# Patient Record
Sex: MALE | Race: WHITE | NOT HISPANIC OR LATINO | Employment: UNEMPLOYED | URBAN - METROPOLITAN AREA
[De-identification: names, ages, dates, MRNs, and addresses within clinical notes are randomized per-mention and may not be internally consistent; named-entity substitution may affect disease eponyms.]

---

## 2023-05-15 LAB
ALBUMIN (MG/L) IN URINE: 32.2 MG/L
ALBUMIN/CREATININE (UG/MG) IN URINE: 32.2 UG/MG CRT (ref 0–30)
ANION GAP IN SER/PLAS: 12 MMOL/L (ref 10–20)
CALCIUM (MG/DL) IN SER/PLAS: 9.7 MG/DL (ref 8.6–10.3)
CARBON DIOXIDE, TOTAL (MMOL/L) IN SER/PLAS: 27 MMOL/L (ref 21–32)
CHLORIDE (MMOL/L) IN SER/PLAS: 103 MMOL/L (ref 98–107)
CHOLESTEROL (MG/DL) IN SER/PLAS: 173 MG/DL (ref 0–199)
CHOLESTEROL IN HDL (MG/DL) IN SER/PLAS: 45.8 MG/DL
CHOLESTEROL/HDL RATIO: 3.8
CREATININE (MG/DL) IN SER/PLAS: 0.91 MG/DL (ref 0.5–1.3)
CREATININE (MG/DL) IN URINE: 100 MG/DL (ref 20–370)
GFR MALE: >90 ML/MIN/1.73M2
GLUCOSE (MG/DL) IN SER/PLAS: 187 MG/DL (ref 74–99)
LDL: 58 MG/DL (ref 0–99)
NON HDL CHOLESTEROL: 127 MG/DL
POTASSIUM (MMOL/L) IN SER/PLAS: 4.4 MMOL/L (ref 3.5–5.3)
SODIUM (MMOL/L) IN SER/PLAS: 138 MMOL/L (ref 136–145)
TRIGLYCERIDE (MG/DL) IN SER/PLAS: 348 MG/DL (ref 0–149)
UREA NITROGEN (MG/DL) IN SER/PLAS: 27 MG/DL (ref 6–23)
VLDL: 70 MG/DL (ref 0–40)

## 2023-05-16 LAB
ESTIMATED AVERAGE GLUCOSE FOR HBA1C: 217 MG/DL
HEMOGLOBIN A1C/HEMOGLOBIN TOTAL IN BLOOD: 9.2 %

## 2023-06-20 LAB
ANION GAP IN SER/PLAS: 14 MMOL/L (ref 10–20)
CALCIUM (MG/DL) IN SER/PLAS: 9.4 MG/DL (ref 8.6–10.3)
CARBON DIOXIDE, TOTAL (MMOL/L) IN SER/PLAS: 22 MMOL/L (ref 21–32)
CHLORIDE (MMOL/L) IN SER/PLAS: 106 MMOL/L (ref 98–107)
CHOLESTEROL (MG/DL) IN SER/PLAS: 173 MG/DL (ref 0–199)
CHOLESTEROL IN HDL (MG/DL) IN SER/PLAS: 38.7 MG/DL
CHOLESTEROL/HDL RATIO: 4.5
CREATININE (MG/DL) IN SER/PLAS: 0.93 MG/DL (ref 0.5–1.3)
ESTIMATED AVERAGE GLUCOSE FOR HBA1C: 177 MG/DL
GFR MALE: 89 ML/MIN/1.73M2
GLUCOSE (MG/DL) IN SER/PLAS: 140 MG/DL (ref 74–99)
HEMOGLOBIN A1C/HEMOGLOBIN TOTAL IN BLOOD: 7.8 %
LDL: 97 MG/DL (ref 0–99)
POTASSIUM (MMOL/L) IN SER/PLAS: 4.5 MMOL/L (ref 3.5–5.3)
SODIUM (MMOL/L) IN SER/PLAS: 137 MMOL/L (ref 136–145)
TRIGLYCERIDE (MG/DL) IN SER/PLAS: 186 MG/DL (ref 0–149)
UREA NITROGEN (MG/DL) IN SER/PLAS: 28 MG/DL (ref 6–23)
VLDL: 37 MG/DL (ref 0–40)

## 2023-11-06 PROBLEM — E09.9 STEROID-INDUCED DIABETES (MULTI): Status: ACTIVE | Noted: 2023-11-06

## 2023-11-06 PROBLEM — L72.3 SEBACEOUS CYST: Status: ACTIVE | Noted: 2023-04-24

## 2023-11-06 PROBLEM — T38.0X5A STEROID-INDUCED DIABETES (MULTI): Status: ACTIVE | Noted: 2023-11-06

## 2023-11-06 PROBLEM — D22.72 MELANOCYTIC NEVI OF LEFT LOWER LIMB, INCLUDING HIP: Status: ACTIVE | Noted: 2023-04-24

## 2023-11-06 PROBLEM — G89.29 CHRONIC PAIN OF BOTH KNEES: Status: ACTIVE | Noted: 2023-11-06

## 2023-11-06 PROBLEM — M25.572 ANKLE PAIN, LEFT: Status: ACTIVE | Noted: 2023-11-06

## 2023-11-06 PROBLEM — L82.0 INFLAMED SEBORRHEIC KERATOSIS: Status: ACTIVE | Noted: 2023-04-24

## 2023-11-06 PROBLEM — E55.9 VITAMIN D DEFICIENCY: Status: ACTIVE | Noted: 2023-11-06

## 2023-11-06 PROBLEM — L73.8 OTHER SPECIFIED FOLLICULAR DISORDERS: Status: ACTIVE | Noted: 2023-04-24

## 2023-11-06 PROBLEM — K63.5 BENIGN COLONIC POLYP: Status: ACTIVE | Noted: 2023-11-06

## 2023-11-06 PROBLEM — R74.8 ELEVATED LIVER ENZYMES: Status: ACTIVE | Noted: 2023-11-06

## 2023-11-06 PROBLEM — E11.9 TYPE 2 DIABETES MELLITUS (MULTI): Status: ACTIVE | Noted: 2023-11-06

## 2023-11-06 PROBLEM — I10 HTN (HYPERTENSION): Status: ACTIVE | Noted: 2023-11-06

## 2023-11-06 PROBLEM — N52.9 ERECTILE DYSFUNCTION: Status: ACTIVE | Noted: 2023-11-06

## 2023-11-06 PROBLEM — K76.0 FATTY LIVER: Status: ACTIVE | Noted: 2023-11-06

## 2023-11-06 PROBLEM — M25.561 CHRONIC PAIN OF BOTH KNEES: Status: ACTIVE | Noted: 2023-11-06

## 2023-11-06 PROBLEM — L21.9 SEBORRHEIC DERMATITIS, UNSPECIFIED: Status: ACTIVE | Noted: 2023-04-24

## 2023-11-06 PROBLEM — E78.00 ELEVATED LOW DENSITY LIPOPROTEIN (LDL) CHOLESTEROL LEVEL: Status: ACTIVE | Noted: 2023-11-06

## 2023-11-06 PROBLEM — R53.83 FATIGUE: Status: ACTIVE | Noted: 2023-11-06

## 2023-11-06 PROBLEM — M25.562 CHRONIC PAIN OF BOTH KNEES: Status: ACTIVE | Noted: 2023-11-06

## 2023-11-06 PROBLEM — L73.9 FOLLICULAR DISORDER, UNSPECIFIED: Status: ACTIVE | Noted: 2023-04-24

## 2023-11-06 PROBLEM — D22.5 MELANOCYTIC NEVI OF TRUNK: Status: ACTIVE | Noted: 2023-04-24

## 2023-11-06 PROBLEM — C18.7 MALIGNANT NEOPLASM OF SIGMOID COLON (MULTI): Status: ACTIVE | Noted: 2023-11-06

## 2023-11-06 PROBLEM — E78.1 HYPERTRIGLYCERIDEMIA: Status: ACTIVE | Noted: 2023-11-06

## 2023-11-06 PROBLEM — M67.88 ACHILLES TENDINOSIS OF LEFT LOWER EXTREMITY: Status: ACTIVE | Noted: 2023-11-06

## 2023-11-06 PROBLEM — L21.8 OTHER SEBORRHEIC DERMATITIS: Status: ACTIVE | Noted: 2023-04-24

## 2023-11-06 PROBLEM — C43.59 MALIGNANT MELANOMA OF SKIN OF ABDOMEN (MULTI): Status: ACTIVE | Noted: 2023-11-06

## 2023-11-06 PROBLEM — M25.511 RIGHT SHOULDER PAIN: Status: ACTIVE | Noted: 2023-11-06

## 2023-11-06 PROBLEM — Z85.820 PERSONAL HISTORY OF MALIGNANT MELANOMA OF SKIN: Status: ACTIVE | Noted: 2023-04-24

## 2023-11-06 PROBLEM — M17.0 ARTHRITIS OF BOTH KNEES: Status: ACTIVE | Noted: 2023-11-06

## 2023-11-06 PROBLEM — L57.0 ACTINIC KERATOSIS: Status: ACTIVE | Noted: 2023-04-24

## 2023-11-06 PROBLEM — M25.521 RIGHT ELBOW PAIN: Status: ACTIVE | Noted: 2023-11-06

## 2023-11-06 PROBLEM — M79.89 SWELLING OF LEFT FOOT: Status: ACTIVE | Noted: 2023-11-06

## 2023-11-06 PROBLEM — M19.90 DJD (DEGENERATIVE JOINT DISEASE): Status: ACTIVE | Noted: 2023-11-06

## 2023-11-06 RX ORDER — GINGER ROOT/GINGER ROOT EXT 262.5 MG
CAPSULE ORAL
COMMUNITY
Start: 2022-01-06

## 2023-11-06 RX ORDER — PANTOPRAZOLE SODIUM 20 MG/1
20 TABLET, DELAYED RELEASE ORAL
COMMUNITY
Start: 2023-07-18

## 2023-11-06 RX ORDER — METFORMIN HYDROCHLORIDE 500 MG/1
2 TABLET, EXTENDED RELEASE ORAL 2 TIMES DAILY
COMMUNITY
Start: 2018-12-19 | End: 2024-02-09 | Stop reason: SDUPTHER

## 2023-11-06 RX ORDER — DOCUSATE SODIUM 100 MG/1
1 CAPSULE, LIQUID FILLED ORAL 3 TIMES DAILY
COMMUNITY
Start: 2023-08-14 | End: 2023-12-05 | Stop reason: WASHOUT

## 2023-11-06 RX ORDER — IBUPROFEN 200 MG
CAPSULE ORAL
COMMUNITY
Start: 2021-06-15

## 2023-11-06 RX ORDER — ACETAMINOPHEN 500 MG
1000 TABLET ORAL 3 TIMES DAILY
COMMUNITY
Start: 2023-07-18 | End: 2023-12-05 | Stop reason: WASHOUT

## 2023-11-06 RX ORDER — KETOCONAZOLE 20 MG/G
CREAM TOPICAL
COMMUNITY
Start: 2021-05-10 | End: 2023-12-05 | Stop reason: WASHOUT

## 2023-11-06 RX ORDER — HYDROCORTISONE ACETATE, IODOQUINOL 19; 10 MG/G; MG/G
CREAM TOPICAL
COMMUNITY
Start: 2021-11-19 | End: 2023-12-05 | Stop reason: WASHOUT

## 2023-11-06 RX ORDER — PHENYLEPHRINE HCL 10 MG
TABLET ORAL
COMMUNITY
End: 2023-12-05 | Stop reason: WASHOUT

## 2023-11-06 RX ORDER — OMEGA-3/DHA/EPA/FISH OIL 1600MG/5ML
1 LIQUID (ML) ORAL DAILY
COMMUNITY

## 2023-11-06 RX ORDER — PANTOPRAZOLE SODIUM 40 MG/1
1 TABLET, DELAYED RELEASE ORAL DAILY
COMMUNITY
Start: 2023-08-14

## 2023-11-06 RX ORDER — ASPIRIN 325 MG
1 TABLET, DELAYED RELEASE (ENTERIC COATED) ORAL DAILY
COMMUNITY

## 2023-11-06 RX ORDER — OXYCODONE HYDROCHLORIDE 5 MG/1
5-10 TABLET ORAL EVERY 6 HOURS PRN
COMMUNITY
Start: 2023-07-18

## 2023-11-06 RX ORDER — BACLOFEN 10 MG/1
10 TABLET ORAL 3 TIMES DAILY
COMMUNITY
Start: 2023-10-26

## 2023-11-06 RX ORDER — LISINOPRIL 20 MG/1
1 TABLET ORAL DAILY
COMMUNITY
Start: 2016-01-18 | End: 2024-02-09 | Stop reason: SDUPTHER

## 2023-11-06 RX ORDER — MULTIVITAMIN
TABLET ORAL
COMMUNITY

## 2023-11-06 RX ORDER — VIT C/E/ZN/COPPR/LUTEIN/ZEAXAN 250MG-90MG
25 CAPSULE ORAL DAILY
COMMUNITY
End: 2023-12-05 | Stop reason: WASHOUT

## 2023-11-06 RX ORDER — CHOLECALCIFEROL (VITAMIN D3) 125 MCG
1 CAPSULE ORAL EVERY 12 HOURS PRN
COMMUNITY

## 2023-11-06 RX ORDER — DAPAGLIFLOZIN 10 MG/1
10 TABLET, FILM COATED ORAL DAILY
COMMUNITY
Start: 2023-05-16 | End: 2024-03-18 | Stop reason: SDUPTHER

## 2023-11-06 RX ORDER — CLINDAMYCIN PHOSPHATE 10 MG/ML
SOLUTION TOPICAL
COMMUNITY
Start: 2022-11-22 | End: 2023-12-05 | Stop reason: WASHOUT

## 2023-11-06 RX ORDER — METFORMIN HYDROCHLORIDE 1000 MG/1
1000 TABLET ORAL
COMMUNITY
End: 2023-12-05 | Stop reason: WASHOUT

## 2023-11-06 RX ORDER — ASCORBIC ACID 500 MG
TABLET ORAL
COMMUNITY

## 2023-11-06 RX ORDER — FLAXSEED OIL 1000 MG
1 CAPSULE ORAL DAILY
COMMUNITY

## 2023-11-09 ENCOUNTER — OFFICE VISIT (OUTPATIENT)
Dept: DERMATOLOGY | Facility: CLINIC | Age: 68
End: 2023-11-09
Payer: MEDICARE

## 2023-11-09 DIAGNOSIS — L57.0 ACTINIC KERATOSIS: ICD-10-CM

## 2023-11-09 DIAGNOSIS — K13.0 CHEILITIS: ICD-10-CM

## 2023-11-09 PROCEDURE — 3074F SYST BP LT 130 MM HG: CPT | Performed by: SPECIALIST

## 2023-11-09 PROCEDURE — 3078F DIAST BP <80 MM HG: CPT | Performed by: SPECIALIST

## 2023-11-09 PROCEDURE — 99213 OFFICE O/P EST LOW 20 MIN: CPT | Performed by: SPECIALIST

## 2023-11-09 PROCEDURE — 1125F AMNT PAIN NOTED PAIN PRSNT: CPT | Performed by: SPECIALIST

## 2023-11-09 PROCEDURE — 3051F HG A1C>EQUAL 7.0%<8.0%: CPT | Performed by: SPECIALIST

## 2023-11-09 PROCEDURE — 4010F ACE/ARB THERAPY RXD/TAKEN: CPT | Performed by: SPECIALIST

## 2023-11-09 NOTE — PROGRESS NOTES
Patient is here for a full body exam. Full body exam done in the presence of chaperone. Eyes:  Conjunctiva normal lids normal. Mouth and throat: Lips normal teeth normal gums normal.  Oropharynx is moist and normal. Neck: Neck is supple without masses. CV: Extremities are  normal without calf tenderness edema varicosities. Abdomen: Is no hepatosplenomegaly.  Lymphatic: Is no cervical axillary or inguinal lymphadenopathy. Extremities: Inspection palpation  of digits and nails is normal without clubbing or cyanosis. Neuro/psych: Orientation to time,  place, person situation is normal. Mood/affect is normal. Skin: Palpation of scalp is normal  inspection of Hair and scalp, eyebrows, face, and extremities normal. Inspection/palpation of  Head/face mild photo damage. Neck mild photo damage. Chest mild photo damage. Breasts are  normal axillary vaults are normal. Abdomen normal.   Genitalia normal groin normal, buttocks normal. Back mild photo damage. Right upper extremity photo damage. Left upper extremity photo damage. Right lower extremity photo damage. Left lower extremity normal. Inspection of eccrine apocrine glands of skin and subcutaneous tissues normal.  Subjective     Cosme Noel is a 68 y.o. male who presents for the following: Skin Check (Hx MM ).     Review of Systems:  No other skin or systemic complaints other than what is documented elsewhere in the note.    The following portions of the chart were reviewed this encounter and updated as appropriate:          Skin Cancer History  No skin cancer on file.      Specialty Problems          Dermatology Problems    Actinic keratosis    Follicular disorder, unspecified    Inflamed seborrheic keratosis    Melanocytic nevi of left lower limb, including hip    Melanocytic nevi of trunk    Other seborrheic dermatitis    Other specified follicular disorders    Personal history of malignant melanoma of skin    Sebaceous cyst    Seborrheic dermatitis, unspecified     Malignant melanoma of skin of abdomen (CMS/HCC)        Objective   Well appearing patient in no apparent distress; mood and affect are within normal limits.    A focused skin examination was performed. All findings within normal limits unless otherwise noted below.    Assessment/Plan   1. Actinic keratosis  Face  Erythematous macules with gritty scale.    Destr of lesion - Face  Complexity: simple    Destruction method: cryotherapy    Informed consent: discussed and consent obtained    Lesion destroyed using liquid nitrogen: Yes    Cryotherapy cycles:  1  Outcome: patient tolerated procedure well with no complications    Post-procedure details: wound care instructions given      2. Cheilitis  Lower lip

## 2023-11-10 RX ORDER — FLUOROURACIL 20 MG/ML
SOLUTION TOPICAL
Qty: 10 ML | Refills: 0 | Status: SHIPPED | OUTPATIENT
Start: 2023-11-10 | End: 2023-12-05 | Stop reason: WASHOUT

## 2023-11-15 ENCOUNTER — TELEPHONE (OUTPATIENT)
Dept: DERMATOLOGY | Facility: CLINIC | Age: 68
End: 2023-11-15

## 2023-11-15 NOTE — TELEPHONE ENCOUNTER
Cosme Sophia had  an appt scheduled for a 1 week med follow up. The med just got filled today. He rescheduled his appointment on 11/30. Should he start taking his med today or wait till the following Wednesday 11/23?

## 2023-11-16 ENCOUNTER — APPOINTMENT (OUTPATIENT)
Dept: DERMATOLOGY | Facility: CLINIC | Age: 68
End: 2023-11-16

## 2023-11-30 ENCOUNTER — OFFICE VISIT (OUTPATIENT)
Dept: DERMATOLOGY | Facility: CLINIC | Age: 68
End: 2023-11-30
Payer: MEDICARE

## 2023-11-30 DIAGNOSIS — K13.0 CHEILITIS: ICD-10-CM

## 2023-11-30 PROCEDURE — 99213 OFFICE O/P EST LOW 20 MIN: CPT | Performed by: SPECIALIST

## 2023-11-30 PROCEDURE — 17000 DESTRUCT PREMALG LESION: CPT | Performed by: SPECIALIST

## 2023-11-30 PROCEDURE — 17003 DESTRUCT PREMALG LES 2-14: CPT | Performed by: SPECIALIST

## 2023-11-30 PROCEDURE — 3051F HG A1C>EQUAL 7.0%<8.0%: CPT | Performed by: SPECIALIST

## 2023-11-30 PROCEDURE — 4010F ACE/ARB THERAPY RXD/TAKEN: CPT | Performed by: SPECIALIST

## 2023-11-30 PROCEDURE — 1125F AMNT PAIN NOTED PAIN PRSNT: CPT | Performed by: SPECIALIST

## 2023-11-30 NOTE — PROGRESS NOTES
Subjective     Cosme Noel is a 68 y.o. male who presents for the following: Follow-up (Cheilitis - Bottom Lip ).     Review of Systems:  No other skin or systemic complaints other than what is documented elsewhere in the note.    The following portions of the chart were reviewed this encounter and updated as appropriate:          Skin Cancer History  No skin cancer on file.      Specialty Problems          Dermatology Problems    Actinic keratosis    Follicular disorder, unspecified    Inflamed seborrheic keratosis    Melanocytic nevi of left lower limb, including hip    Melanocytic nevi of trunk    Other seborrheic dermatitis    Other specified follicular disorders    Personal history of malignant melanoma of skin    Sebaceous cyst    Seborrheic dermatitis, unspecified    Malignant melanoma of skin of abdomen (CMS/HCC)        Objective   Well appearing patient in no apparent distress; mood and affect are within normal limits.    A focused skin examination was performed. All findings within normal limits unless otherwise noted below.    Assessment/Plan   1. Cheilitis  Mid Lower [place] Lip

## 2023-11-30 NOTE — PROGRESS NOTES
Subjective     Cosme Noel is a 68 y.o. male who presents for the following: Follow-up (Cheilitis - Bottom Lip ) and Actinic Keratosis (Face.).     Review of Systems:  No other skin or systemic complaints other than what is documented elsewhere in the note.    The following portions of the chart were reviewed this encounter and updated as appropriate:          Skin Cancer History  No skin cancer on file.      Specialty Problems          Dermatology Problems    Actinic keratosis    Follicular disorder, unspecified    Inflamed seborrheic keratosis    Melanocytic nevi of left lower limb, including hip    Melanocytic nevi of trunk    Other seborrheic dermatitis    Other specified follicular disorders    Personal history of malignant melanoma of skin    Sebaceous cyst    Seborrheic dermatitis, unspecified    Malignant melanoma of skin of abdomen (CMS/HCC)        Objective   Well appearing patient in no apparent distress; mood and affect are within normal limits.    A focused skin examination was performed. All findings within normal limits unless otherwise noted below.    Assessment/Plan   1. Cheilitis  Mid Lower [place] Lip

## 2023-11-30 NOTE — PROGRESS NOTES
Subjective   HPI: Cosme Noel is a 68 y.o. male is here for evaluation and treatment of spots on my face and lip.    ROS: No other skin or systemic complaints other than what is documented elsewhere in the note.    ALLERGIES: Bee venom protein (honey bee)    SOCIAL:  has no history on file for tobacco use, alcohol use, and drug use.    Objective     Description: Patient has several erythematous scaly sensitive lesions involving his face.  There is some inflammation of the lower lip.  Patient has been using 2% 5-FU to this area.  Patient should continue topical 5-FU for 1 more week and discontinue use.    Assessment/Plan   1. Cheilitis  Mid Lower [place] Lip         FOLLOW UP: 3 months.    The patient was encouraged to contact me with any further questions or concerns.  Ankit Blanchard MD  11/30/2023

## 2023-12-05 ENCOUNTER — OFFICE VISIT (OUTPATIENT)
Dept: ENDOCRINOLOGY | Facility: CLINIC | Age: 68
End: 2023-12-05
Payer: MEDICARE

## 2023-12-05 VITALS
WEIGHT: 203 LBS | BODY MASS INDEX: 30.07 KG/M2 | SYSTOLIC BLOOD PRESSURE: 138 MMHG | HEART RATE: 71 BPM | DIASTOLIC BLOOD PRESSURE: 80 MMHG | HEIGHT: 69 IN

## 2023-12-05 DIAGNOSIS — E11.9 TYPE 2 DIABETES MELLITUS WITHOUT COMPLICATION, UNSPECIFIED WHETHER LONG TERM INSULIN USE (MULTI): Primary | ICD-10-CM

## 2023-12-05 LAB
POC FINGERSTICK BLOOD GLUCOSE: 168 MG/DL (ref 70–100)
POC HEMOGLOBIN A1C: 7.6 % (ref 4.2–6.5)

## 2023-12-05 PROCEDURE — 1160F RVW MEDS BY RX/DR IN RCRD: CPT | Performed by: INTERNAL MEDICINE

## 2023-12-05 PROCEDURE — 3079F DIAST BP 80-89 MM HG: CPT | Performed by: INTERNAL MEDICINE

## 2023-12-05 PROCEDURE — 83036 HEMOGLOBIN GLYCOSYLATED A1C: CPT | Performed by: INTERNAL MEDICINE

## 2023-12-05 PROCEDURE — 4010F ACE/ARB THERAPY RXD/TAKEN: CPT | Performed by: INTERNAL MEDICINE

## 2023-12-05 PROCEDURE — 3051F HG A1C>EQUAL 7.0%<8.0%: CPT | Performed by: INTERNAL MEDICINE

## 2023-12-05 PROCEDURE — 1036F TOBACCO NON-USER: CPT | Performed by: INTERNAL MEDICINE

## 2023-12-05 PROCEDURE — 99214 OFFICE O/P EST MOD 30 MIN: CPT | Performed by: INTERNAL MEDICINE

## 2023-12-05 PROCEDURE — 82962 GLUCOSE BLOOD TEST: CPT | Performed by: INTERNAL MEDICINE

## 2023-12-05 PROCEDURE — 1125F AMNT PAIN NOTED PAIN PRSNT: CPT | Performed by: INTERNAL MEDICINE

## 2023-12-05 PROCEDURE — 3075F SYST BP GE 130 - 139MM HG: CPT | Performed by: INTERNAL MEDICINE

## 2023-12-05 PROCEDURE — 1159F MED LIST DOCD IN RCRD: CPT | Performed by: INTERNAL MEDICINE

## 2023-12-05 PROCEDURE — 95251 CONT GLUC MNTR ANALYSIS I&R: CPT | Performed by: INTERNAL MEDICINE

## 2023-12-05 RX ORDER — BUTYROSPERMUM PARKII(SHEA BUTTER), SIMMONDSIA CHINENSIS (JOJOBA) SEED OIL, ALOE BARBADENSIS LEAF EXTRACT .01; 1; 3.5 G/100G; G/100G; G/100G
250 LIQUID TOPICAL 2 TIMES DAILY
COMMUNITY

## 2023-12-05 RX ORDER — GLIMEPIRIDE 2 MG/1
2 TABLET ORAL
Qty: 60 TABLET | Refills: 5 | Status: SHIPPED | OUTPATIENT
Start: 2023-12-05 | End: 2023-12-05 | Stop reason: WASHOUT

## 2023-12-05 RX ORDER — GLIMEPIRIDE 1 MG/1
1 TABLET ORAL 2 TIMES DAILY
Qty: 60 TABLET | Refills: 5 | Status: SHIPPED | OUTPATIENT
Start: 2023-12-05 | End: 2024-06-02

## 2023-12-05 RX ORDER — GLUCOSAMINE/CHONDRO SU A 500-400 MG
1 TABLET ORAL 3 TIMES DAILY
COMMUNITY

## 2023-12-05 NOTE — PROGRESS NOTES
"Subjective   Cosme Noel is a 68 y.o. male who presents for follow-up of Type 2 diabetes mellitus. The initial diagnosis of diabetes was made in 2017  after undergoing chemotherapy.      He states that his balance is off.  His endurance is low.  He needs to walk three miles without assistance in order to undergo knee surgery.  PT ended in November after undergoing hip surgery in July 2023.      He feels as though life is pointless.  He already cancer.  He thinks that medication is all futile.  He feels as though life went downhill after the hip replacement.      Known complications due to diabetes included peripheral neuropathy    Cardiovascular risk factors include advanced age (older than 55 for men, 65 for women), diabetes mellitus, male gender, and microalbuminuria. The patient is on an ACE inhibitor or angiotensin II receptor blocker.  The patient has not been previously hospitalized due to diabetic ketoacidosis.     Current symptoms/problems include none. His clinical course has been stable.     The patient is not currently checking the blood glucose.       [employer] CGM DATA FROM SAMPLE  Average 168mg.dL  0% of values below target range  67% of values within target range  33% of values above target range      Previous medications tried:  Trulicity for three months      Current Medication Regimen  Metformin 1000mg twice daily   Farxiga 10mg once daily      MEALS:   Breakfast - oatmeal with sunflower seeds  Lunch - can omit   Dinner - salad, red meats, soups  Snacks - popcorn, apple, banana, blueberries   Beverages - water, sun tea (unsweetened); he has not had beer in five weeks     Exercise regimen - daily; walks 2 miles five times per week        Objective   /80 (BP Location: Left arm, Patient Position: Sitting, BP Cuff Size: Adult)   Pulse 71   Ht 1.753 m (5' 9\")   Wt 92.1 kg (203 lb)   BMI 29.98 kg/m² Dictation #1  MRN:[id number]  CSN:[id number]   Physical Exam  Vitals and nursing note " reviewed.   Constitutional:       General: He is not in acute distress.     Appearance: Normal appearance. He is normal weight.   HENT:      Head: Normocephalic and atraumatic.      Nose: Nose normal.      Mouth/Throat:      Mouth: Mucous membranes are moist.   Eyes:      Extraocular Movements: Extraocular movements intact.   Pulmonary:      Effort: Pulmonary effort is normal.      Breath sounds: Normal breath sounds.   Musculoskeletal:         General: Normal range of motion.   Neurological:      Mental Status: He is alert and oriented to person, place, and time.   Psychiatric:      Comments: Despondent          Lab Review  Glucose (mg/dL)   Date Value   06/20/2023 140 (H)   06/07/2023 166 (H)   05/15/2023 187 (H)     POC HEMOGLOBIN A1c (%)   Date Value   12/05/2023 7.6 (A)     Hemoglobin A1C (%)   Date Value   06/27/2023 7.6 (H)   06/20/2023 7.8 (A)   05/15/2023 9.2 (A)   03/14/2022 8.4 (A)     Bicarbonate (mmol/L)   Date Value   06/20/2023 22   06/07/2023 21   05/15/2023 27     Urea Nitrogen (mg/dL)   Date Value   06/20/2023 28 (H)   06/07/2023 34 (H)   05/15/2023 27 (H)     Creatinine (mg/dL)   Date Value   06/20/2023 0.93   06/07/2023 0.99   05/15/2023 0.91       Health Maintenance:   Foot Exam: August 14, 2023  Eye Exam:  November 2022  Lipid Panel:  June 20, 2023  Urine Albumin:  May 15, 2023    Assessment/Plan   68-year-old male presents for follow up for type 2 diabetes mellitus. His last hemoglobin A1c was found to be 7.6% as of today. His blood pressure is at goal. He is noted to be on an ACE inhibitor.    Type 2 diabetes mellitus (CMS/[facility])  To continue Metformin 1000mg twice daily   To continue Farxiga 10mg once daily   To commence Glimepiride 1mg twice daily   For patient assistance program with Farxiga   Please find a new PCP as there seems to be great with regards to mental health  Counseled that the goal A1C should be 7% or less  Counseled glycemic control is warranted to prevent microvascular  complications  Please check blood sugars 1-2 times daily and keep a detailed log    For follow up in 5 months

## 2023-12-05 NOTE — PATIENT INSTRUCTIONS
Thank you for choosing [facility]  for your health care needs.  If you have any questions, concerns or medical needs, please feel free to contact our office at [phone].    Please ensure you complete your blood work one week before the next scheduled appointment.    To continue Metformin 1000mg twice daily   To continue Farxiga 10mg once daily   To commence Glimepiride 1mg twice daily   For patient assistance program with Farxiga   Please find a new PCP   For follow up in 5 months

## 2023-12-06 ENCOUNTER — LAB (OUTPATIENT)
Dept: LAB | Facility: CLINIC | Age: 68
End: 2023-12-06
Payer: MEDICARE

## 2023-12-06 ENCOUNTER — TELEPHONE (OUTPATIENT)
Dept: HEMATOLOGY/ONCOLOGY | Facility: HOSPITAL | Age: 68
End: 2023-12-06

## 2023-12-06 DIAGNOSIS — C18.7 MALIGNANT NEOPLASM OF SIGMOID COLON (MULTI): ICD-10-CM

## 2023-12-06 DIAGNOSIS — C18.7 MALIGNANT NEOPLASM OF SIGMOID COLON (MULTI): Primary | ICD-10-CM

## 2023-12-06 LAB
ALBUMIN SERPL BCP-MCNC: 4.8 G/DL (ref 3.4–5)
ALP SERPL-CCNC: 101 U/L (ref 33–136)
ALT SERPL W P-5'-P-CCNC: 31 U/L (ref 10–52)
ANION GAP SERPL CALC-SCNC: 17 MMOL/L (ref 10–20)
AST SERPL W P-5'-P-CCNC: 26 U/L (ref 9–39)
BASOPHILS # BLD AUTO: 0.04 X10*3/UL (ref 0–0.1)
BASOPHILS NFR BLD AUTO: 0.5 %
BILIRUB SERPL-MCNC: 0.5 MG/DL (ref 0–1.2)
BUN SERPL-MCNC: 22 MG/DL (ref 6–23)
CALCIUM SERPL-MCNC: 10.1 MG/DL (ref 8.6–10.6)
CEA SERPL-MCNC: 0.9 UG/L
CHLORIDE SERPL-SCNC: 103 MMOL/L (ref 98–107)
CO2 SERPL-SCNC: 27 MMOL/L (ref 21–32)
CREAT SERPL-MCNC: 0.9 MG/DL (ref 0.5–1.3)
EOSINOPHIL # BLD AUTO: 0.08 X10*3/UL (ref 0–0.7)
EOSINOPHIL NFR BLD AUTO: 1 %
ERYTHROCYTE [DISTWIDTH] IN BLOOD BY AUTOMATED COUNT: 13.8 % (ref 11.5–14.5)
GFR SERPL CREATININE-BSD FRML MDRD: >90 ML/MIN/1.73M*2
GLUCOSE SERPL-MCNC: 156 MG/DL (ref 74–99)
HCT VFR BLD AUTO: 45.2 % (ref 41–52)
HGB BLD-MCNC: 15.1 G/DL (ref 13.5–17.5)
IMM GRANULOCYTES # BLD AUTO: 0.01 X10*3/UL (ref 0–0.7)
IMM GRANULOCYTES NFR BLD AUTO: 0.1 % (ref 0–0.9)
LYMPHOCYTES # BLD AUTO: 2 X10*3/UL (ref 1.2–4.8)
LYMPHOCYTES NFR BLD AUTO: 25 %
MCH RBC QN AUTO: 30.3 PG (ref 26–34)
MCHC RBC AUTO-ENTMCNC: 33.4 G/DL (ref 32–36)
MCV RBC AUTO: 91 FL (ref 80–100)
MONOCYTES # BLD AUTO: 0.64 X10*3/UL (ref 0.1–1)
MONOCYTES NFR BLD AUTO: 8 %
NEUTROPHILS # BLD AUTO: 5.24 X10*3/UL (ref 1.2–7.7)
NEUTROPHILS NFR BLD AUTO: 65.4 %
NRBC BLD-RTO: NORMAL /100{WBCS}
PLATELET # BLD AUTO: 183 X10*3/UL (ref 150–450)
POTASSIUM SERPL-SCNC: 4.4 MMOL/L (ref 3.5–5.3)
PROT SERPL-MCNC: 6.9 G/DL (ref 6.4–8.2)
RBC # BLD AUTO: 4.98 X10*6/UL (ref 4.5–5.9)
SODIUM SERPL-SCNC: 143 MMOL/L (ref 136–145)
WBC # BLD AUTO: 8 X10*3/UL (ref 4.4–11.3)

## 2023-12-06 PROCEDURE — 80053 COMPREHEN METABOLIC PANEL: CPT | Performed by: NURSE PRACTITIONER

## 2023-12-06 PROCEDURE — 82378 CARCINOEMBRYONIC ANTIGEN: CPT | Performed by: NURSE PRACTITIONER

## 2023-12-06 PROCEDURE — 85025 COMPLETE CBC W/AUTO DIFF WBC: CPT

## 2023-12-06 PROCEDURE — 36415 COLL VENOUS BLD VENIPUNCTURE: CPT

## 2023-12-07 NOTE — ASSESSMENT & PLAN NOTE
To continue Metformin 1000mg twice daily   To continue Farxiga 10mg once daily   To commence Glimepiride 1mg twice daily   For patient assistance program with Farxiga   Please find a new PCP as there seems to be great with regards to mental health  Counseled that the goal A1C should be 7% or less  Counseled glycemic control is warranted to prevent microvascular complications  Please check blood sugars 1-2 times daily and keep a detailed log    For follow up in 5 months

## 2023-12-08 ENCOUNTER — OFFICE VISIT (OUTPATIENT)
Dept: HEMATOLOGY/ONCOLOGY | Facility: CLINIC | Age: 68
End: 2023-12-08
Payer: MEDICARE

## 2023-12-08 VITALS — TEMPERATURE: 97.5 F | OXYGEN SATURATION: 97 %

## 2023-12-08 DIAGNOSIS — C18.7 MALIGNANT NEOPLASM OF SIGMOID COLON (MULTI): Primary | ICD-10-CM

## 2023-12-08 DIAGNOSIS — C43.59 MALIGNANT MELANOMA OF SKIN OF ABDOMEN (MULTI): ICD-10-CM

## 2023-12-08 PROCEDURE — 1125F AMNT PAIN NOTED PAIN PRSNT: CPT | Performed by: NURSE PRACTITIONER

## 2023-12-08 PROCEDURE — 99213 OFFICE O/P EST LOW 20 MIN: CPT | Mod: PO | Performed by: NURSE PRACTITIONER

## 2023-12-08 PROCEDURE — 1159F MED LIST DOCD IN RCRD: CPT | Performed by: NURSE PRACTITIONER

## 2023-12-08 PROCEDURE — 1036F TOBACCO NON-USER: CPT | Performed by: NURSE PRACTITIONER

## 2023-12-08 PROCEDURE — 3051F HG A1C>EQUAL 7.0%<8.0%: CPT | Performed by: NURSE PRACTITIONER

## 2023-12-08 PROCEDURE — 4010F ACE/ARB THERAPY RXD/TAKEN: CPT | Performed by: NURSE PRACTITIONER

## 2023-12-08 PROCEDURE — 99213 OFFICE O/P EST LOW 20 MIN: CPT | Performed by: NURSE PRACTITIONER

## 2023-12-08 PROCEDURE — 1160F RVW MEDS BY RX/DR IN RCRD: CPT | Performed by: NURSE PRACTITIONER

## 2023-12-08 ASSESSMENT — PAIN SCALES - GENERAL
PAINLEVEL: 4
PAINLEVEL: 4

## 2023-12-08 NOTE — PROGRESS NOTES
Patient Visit Information:   Visit Type: Follow Up Visit      Cancer History:          Digestive System-Colon and Rectum         AJCC Edition: 7th (AJCC), Diagnosis Date: 13-Dec-2016, IIIB, T3 N1b M0      Treatment Synopsis:    Patient is a 65 year old male who underwent a colonoscopy in 2008 at which time he was found to have a colonic adenocarcinoma arising in a pedunculated polyp.  He has since been followed intermittently with colonoscopy but noted in October 2016 to have some blood in his stool. He thought this was hemorrhoids but underwent a colonoscopy 10/28/16 that showed the presence of a cancer. He then underwent a laproscopic  proctosigmoidectomy with a coloanal anastomosis on 12/13/16 with pathology showed a low grade adenocarcinoma, 4.5cm in size, margins were negative, +perinerual and lymphovascular invasion, 2 tumor deposits were seen and 3/18 lymph nodes were involved.  Overall this was consistent with a [id number], stage IIIB colon cancer. MMR was intact.   CEA at diagnosis elevated to 4.9.       1/24/17: adjuvant FOLFOX initiated, last dose received on 6/27/17.  10/9/17: Colonoscopy 2 polyps excised = adenoma   12/15/17: Annual CT scans: No evidence of disease. CEA level = <0.5  10/2018: C-scope with Dr. Álvarez showed 2 polyps in ascending colon. Path: tubular adenomas  11/4/19: C-scope with Dr. Álvarez showed 1 polyp at ileocecal valve.  Path: lymphoid tissue & mild hyperplasia  12/2018: CT with RANDOLPH. CEA 1  12/2019: CEA 0.6, CT with RANDOLPH      History of Present Illness:      ID Statement:    CRISTI NOEL is a 67 year old Male        Chief Complaint: colon cancer  / melanoma   surveillance visit   Interval History:    Mr. Noel is seen in follow up for his stage III colon cancer and stage I melanoma.     He is 7 years out from colon cancer surgery.   3 years out from melanoma resection.      EGD and colonoscopy in done in Dec 2022  1 sessile polyp removed      Seeing Dr. Blanchard of dermatology  every 6 months     Continues to suffer with neuropathy after chemo - feels like its getting slightly worse at times     Physical Exam:    did not stay for exam     Lab on 12/06/2023   Component Date Value    Carcinoembryonic AG 12/06/2023 0.9     WBC 12/06/2023 8.0     nRBC 12/06/2023      RBC 12/06/2023 4.98     Hemoglobin 12/06/2023 15.1     Hematocrit 12/06/2023 45.2     MCV 12/06/2023 91     MCH 12/06/2023 30.3     MCHC 12/06/2023 33.4     RDW 12/06/2023 13.8     Platelets 12/06/2023 183     Neutrophils % 12/06/2023 65.4     Immature Granulocytes %,* 12/06/2023 0.1     Lymphocytes % 12/06/2023 25.0     Monocytes % 12/06/2023 8.0     Eosinophils % 12/06/2023 1.0     Basophils % 12/06/2023 0.5     Neutrophils Absolute 12/06/2023 5.24     Immature Granulocytes Ab* 12/06/2023 0.01     Lymphocytes Absolute 12/06/2023 2.00     Monocytes Absolute 12/06/2023 0.64     Eosinophils Absolute 12/06/2023 0.08     Basophils Absolute 12/06/2023 0.04     Glucose 12/06/2023 156 (H)     Sodium 12/06/2023 143     Potassium 12/06/2023 4.4     Chloride 12/06/2023 103     Bicarbonate 12/06/2023 27     Anion Gap 12/06/2023 17     Urea Nitrogen 12/06/2023 22     Creatinine 12/06/2023 0.90     eGFR 12/06/2023 >90     Calcium 12/06/2023 10.1     Albumin 12/06/2023 4.8     Alkaline Phosphatase 12/06/2023 101     Total Protein 12/06/2023 6.9     AST 12/06/2023 26     Bilirubin, Total 12/06/2023 0.5     ALT 12/06/2023 31        ssessment and Plan:      Assessment and Plan:   Assessment:    Patient is a 67 year old male with a history of stage IIIB colon cancer (12/2016) who presents today for followup. He completed adjuvant chemotherapy in June 2017.    Now  7  years out from surgical resection.  CEA level is normal.  Up to date on colonoscopy - Dec 2022( 1 polyp)   CT on 12/3/22 showed no  evidence of malignancy,      He also was found to have an invasive melanoma on his R. torso 11/19/20.  Stage pTIa.  Depth 5mm. Margins positive.  Now on  surveillance with Derm every 4 - 6 months.        Plan:     CRC surveillance:  - He has met 7 year morris out from surgery   - no further imaging or labs needed.        - he prefers annual colonoscopy with Dr. Álvarez - last one Dec 2022     - order placed for colonoscopy      Melanoma, T1N0M0, s/p wle 11/2020, [facility]PH:  - per NCCN guidelines - needs every 6 mo H & P for early stage melanoma - no scans needed    - continue derm follow up for skin checks, q6months      Discussed he has completed surveillance regimen  - will need to continue colonoscopy & dermatology follow up.     He did not want to have surveillance with NP & prefers follow up with MD

## 2023-12-11 ENCOUNTER — TELEPHONE (OUTPATIENT)
Dept: HEMATOLOGY/ONCOLOGY | Facility: HOSPITAL | Age: 68
End: 2023-12-11

## 2023-12-11 NOTE — TELEPHONE ENCOUNTER
"Patient calls to state that his appointment with Dr. Florian for tomorrow was cancelled and he saw Kemi Mendes. CNP on Friday, 12/8. Pt states that the appointment \"did not go well\" and that he does not understand what the plan of care moving forward will be.   Cosem can be reached at [phone].  Message sent to Dr. Florian.  "

## 2023-12-12 ENCOUNTER — APPOINTMENT (OUTPATIENT)
Dept: HEMATOLOGY/ONCOLOGY | Facility: CLINIC | Age: 68
End: 2023-12-12
Payer: MEDICARE

## 2023-12-19 ENCOUNTER — TELEPHONE (OUTPATIENT)
Dept: HEMATOLOGY/ONCOLOGY | Facility: CLINIC | Age: 68
End: 2023-12-19

## 2023-12-19 DIAGNOSIS — C18.7 MALIGNANT NEOPLASM OF SIGMOID COLON (MULTI): ICD-10-CM

## 2023-12-26 NOTE — TELEPHONE ENCOUNTER
Dr. Florian is out PTO. I tried calling patient twice, but it was forwarded to . Left  about scheduling a Telehealth visit with Dr. Florian to discuss his concerns, next available, if patient prefers. Left phone number to call us back to schedule this. TOYIN TATE RN

## 2024-01-02 ENCOUNTER — OFFICE VISIT (OUTPATIENT)
Dept: HEMATOLOGY/ONCOLOGY | Facility: CLINIC | Age: 69
End: 2024-01-02
Payer: MEDICARE

## 2024-01-02 DIAGNOSIS — C18.7 MALIGNANT NEOPLASM OF SIGMOID COLON (MULTI): ICD-10-CM

## 2024-01-02 PROCEDURE — 99214 OFFICE O/P EST MOD 30 MIN: CPT | Performed by: INTERNAL MEDICINE

## 2024-01-02 PROCEDURE — 4010F ACE/ARB THERAPY RXD/TAKEN: CPT | Performed by: INTERNAL MEDICINE

## 2024-01-02 PROCEDURE — 1036F TOBACCO NON-USER: CPT | Performed by: INTERNAL MEDICINE

## 2024-01-02 PROCEDURE — 1125F AMNT PAIN NOTED PAIN PRSNT: CPT | Performed by: INTERNAL MEDICINE

## 2024-01-02 NOTE — PROGRESS NOTES
Patient Visit Information:   Visit Type: Follow Up Visit      Cancer History:          Digestive System-Colon and Rectum         AJCC Edition: 7th (AJCC), Diagnosis Date: 13-Dec-2016, IIIB, T3 N1b M0      Treatment Synopsis:    Patient is a 65 year old male who underwent a colonoscopy in 2008 at which time he was found to have a colonic adenocarcinoma arising in a pedunculated polyp.  He has since been followed intermittently with colonoscopy but noted in October 2016 to have some blood in his stool. He thought this was hemorrhoids but underwent a colonoscopy 10/28/16 that showed the presence of a cancer. He then underwent a laproscopic  proctosigmoidectomy with a coloanal anastomosis on 12/13/16 with pathology showed a low grade adenocarcinoma, 4.5cm in size, margins were negative, +perinerual and lymphovascular invasion, 2 tumor deposits were seen and 3/18 lymph nodes were involved.  Overall this was consistent with a [id number], stage IIIB colon cancer. MMR was intact.   CEA at diagnosis elevated to 4.9.       1/24/17: adjuvant FOLFOX initiated, last dose received on 6/27/17.  10/9/17: Colonoscopy 2 polyps excised = adenoma   12/15/17: Annual CT scans: No evidence of disease. CEA level = <0.5  10/2018: C-scope with Dr. Álvarez showed 2 polyps in ascending colon. Path: tubular adenomas  11/4/19: C-scope with Dr. Álvarez showed 1 polyp at ileocecal valve.  Path: lymphoid tissue & mild hyperplasia  12/2018: CT with RANDOLPH. CEA 1  12/2019: CEA 0.6, CT with RANDOLPH      History of Present Illness:      ID Statement:    CRISTI NOEL is a 67 year old Male        Chief Complaint: colon cancer  / melanoma   surveillance visit   Interval History:    Mr. Noel is seen in follow up for his stage III colon cancer and stage I melanoma.     He is 7 years out from colon cancer surgery.   3 years out from melanoma resection.      EGD and colonoscopy in done in Dec 2022.  1 sessile polyp removed.       Colonoscopy scheduled for 3/2023.       Seeing Dr. Blanchard of dermatology every 6 months.    Continues to suffer with neuropathy after chemo - feels like its getting slightly worse at times     Physical Exam:    did not stay for exam     Lab on 12/06/2023   Component Date Value    Carcinoembryonic AG 12/06/2023 0.9     WBC 12/06/2023 8.0     nRBC 12/06/2023      RBC 12/06/2023 4.98     Hemoglobin 12/06/2023 15.1     Hematocrit 12/06/2023 45.2     MCV 12/06/2023 91     MCH 12/06/2023 30.3     MCHC 12/06/2023 33.4     RDW 12/06/2023 13.8     Platelets 12/06/2023 183     Neutrophils % 12/06/2023 65.4     Immature Granulocytes %,* 12/06/2023 0.1     Lymphocytes % 12/06/2023 25.0     Monocytes % 12/06/2023 8.0     Eosinophils % 12/06/2023 1.0     Basophils % 12/06/2023 0.5     Neutrophils Absolute 12/06/2023 5.24     Immature Granulocytes Ab* 12/06/2023 0.01     Lymphocytes Absolute 12/06/2023 2.00     Monocytes Absolute 12/06/2023 0.64     Eosinophils Absolute 12/06/2023 0.08     Basophils Absolute 12/06/2023 0.04     Glucose 12/06/2023 156 (H)     Sodium 12/06/2023 143     Potassium 12/06/2023 4.4     Chloride 12/06/2023 103     Bicarbonate 12/06/2023 27     Anion Gap 12/06/2023 17     Urea Nitrogen 12/06/2023 22     Creatinine 12/06/2023 0.90     eGFR 12/06/2023 >90     Calcium 12/06/2023 10.1     Albumin 12/06/2023 4.8     Alkaline Phosphatase 12/06/2023 101     Total Protein 12/06/2023 6.9     AST 12/06/2023 26     Bilirubin, Total 12/06/2023 0.5     ALT 12/06/2023 31      === 12/08/22 ===    CT CHEST ABDOMEN PELVIS W IV CONTRAST    - Impression -  CHEST  1.  Unremarkable exam    ABDOMEN - PELVIS  1.  Fatty infiltration of the liver. Otherwise unremarkable without  evidence of metastatic disease or interval change.       Assessment and Plan:   Assessment:    Patient is a 68 y.o.  male with a history of stage I Crc s/p endoscopic resection.   stage IIIB colon cancer (12/2016) who presents today for followup. He completed adjuvant chemotherapy in June  2017.    Now 7 years out from surgical resection. CEA level is normal.  Up to date on colonoscopy - Dec 2022( 1 polyp)   CT on 12/3/22 showed no evidence of malignancy.        He also was found to have an invasive melanoma on his R. torso 11/19/20.  Stage pTIa.  Depth 5mm. Margins positive.  Now on surveillance with Derm every months.     Plan:  CRC surveillance:   -He has met 7 year morris out from surgery   - no further imaging or labs needed.        -He prefers annual colonoscopy with Dr. Álvarez - last one Dec 2022 she is on sabbatical and may not be coming back to clinical medicine.   - Colonoscopy planned for 3/2024.    - Follow up in ~9 months.  Will continue to follow given history of multiple cancers.      Melanoma, T1N0M0, s/p wle 11/2020, [facility]:  -Per NCCN guidelines - needs every 6 mo H & P for early stage melanoma - no scans needed   -Continue derm follow up for skin checks, q6months   -Discussed he has completed surveillance regimen    -Will need to continue colonoscopy & dermatology follow up.     He did not want to have surveillance with NP & prefers follow up with MD

## 2024-02-09 DIAGNOSIS — E11.9 TYPE 2 DIABETES MELLITUS WITHOUT COMPLICATION, UNSPECIFIED WHETHER LONG TERM INSULIN USE (MULTI): Primary | ICD-10-CM

## 2024-02-09 RX ORDER — LISINOPRIL 20 MG/1
20 TABLET ORAL DAILY
Qty: 90 TABLET | Refills: 1 | Status: SHIPPED | OUTPATIENT
Start: 2024-02-09 | End: 2024-08-07

## 2024-02-09 RX ORDER — METFORMIN HYDROCHLORIDE 500 MG/1
1000 TABLET, EXTENDED RELEASE ORAL 2 TIMES DAILY
Qty: 360 TABLET | Refills: 1 | Status: SHIPPED | OUTPATIENT
Start: 2024-02-09 | End: 2024-08-07

## 2024-02-09 NOTE — TELEPHONE ENCOUNTER
Patient called stating he has called and left a voicemail with no response to lisinopril renewal. The request has been sent to Dr. Truong. He states his pharmacy has made several attempts to request refills; it has been advised to pharmacy that we do not accept pharmacy refill request and direct the patient to call the office. Patient also states he is completely out of medication and is asking that a new prescription is sent immediately. Patient mentioned he recently had surgery and it is imperative that he take lisinopril and metformin together. He made complaints of medications being prescribed without refills. Please send to [employer] [facility].

## 2024-02-26 ENCOUNTER — TELEPHONE (OUTPATIENT)
Dept: GASTROENTEROLOGY | Facility: CLINIC | Age: 69
End: 2024-02-26
Payer: MEDICARE

## 2024-02-26 NOTE — TELEPHONE ENCOUNTER
MD Letitia Montes MA  Off of these for a week at least.  THX          Previous Messages       ----- Message -----  From: Letitia Nava MA  Sent: 2/23/2024   1:34 PM EST  To: Rhoda Haynes MD  Subject: Telephone Call                                  Patient advised that he had knee replacement at [facility] and was put on Eliquis, Baclofen, OxyContin until 3/1 . Wants to know how long he needs be off these medications before he can peruse Colonoscopy.

## 2024-03-01 ENCOUNTER — TELEPHONE (OUTPATIENT)
Dept: GASTROENTEROLOGY | Facility: CLINIC | Age: 69
End: 2024-03-01
Payer: MEDICARE

## 2024-03-01 NOTE — TELEPHONE ENCOUNTER
From: Rhoda Haynes MD  Sent: 3/1/2024   2:35 PM EST  To: Letitia Nava MA  Subject: RE: Colon 3/20                                   I left him a message that antibiotics are not needed with colonoscopy for patients with joint replacement (including knee).  The guideline is to not give antibiotics as they are not recommended.  In case he called back.  THX!      ----- Message -----  From: Letitia Nava MA  Sent: 3/1/2024   9:28 AM EST  To: Rhoda Haynes MD  Subject: Colon 3/20                                       Patient had knee replacement at [facility] and Ortho advised at his follow up that he should be on antibiotics 3 days prior and after procedure. Patient said they want you to order antibiotics.

## 2024-03-08 ENCOUNTER — APPOINTMENT (OUTPATIENT)
Dept: GASTROENTEROLOGY | Facility: EXTERNAL LOCATION | Age: 69
End: 2024-03-08
Payer: MEDICARE

## 2024-03-18 DIAGNOSIS — E11.9 TYPE 2 DIABETES MELLITUS WITHOUT COMPLICATION, UNSPECIFIED WHETHER LONG TERM INSULIN USE (MULTI): Primary | ICD-10-CM

## 2024-03-18 RX ORDER — DAPAGLIFLOZIN 10 MG/1
10 TABLET, FILM COATED ORAL DAILY
Qty: 90 TABLET | Refills: 0 | Status: SHIPPED | OUTPATIENT
Start: 2024-03-18 | End: 2024-06-16

## 2024-03-18 NOTE — TELEPHONE ENCOUNTER
Patient sent [employer] message with request to refill Farxiga.  [facility] confirmed.  Pended for approval.

## 2024-03-20 ENCOUNTER — LAB REQUISITION (OUTPATIENT)
Dept: LAB | Facility: HOSPITAL | Age: 69
End: 2024-03-20
Payer: MEDICARE

## 2024-03-20 ENCOUNTER — OFFICE VISIT (OUTPATIENT)
Dept: GASTROENTEROLOGY | Facility: EXTERNAL LOCATION | Age: 69
End: 2024-03-20
Payer: MEDICARE

## 2024-03-20 DIAGNOSIS — D12.4 BENIGN NEOPLASM OF DESCENDING COLON: ICD-10-CM

## 2024-03-20 DIAGNOSIS — D12.5 BENIGN NEOPLASM OF SIGMOID COLON: ICD-10-CM

## 2024-03-20 DIAGNOSIS — K64.8 INTERNAL HEMORRHOIDS: ICD-10-CM

## 2024-03-20 DIAGNOSIS — Z85.038 PERSONAL HISTORY OF COLON CANCER: ICD-10-CM

## 2024-03-20 DIAGNOSIS — Z12.11 COLON CANCER SCREENING: Primary | ICD-10-CM

## 2024-03-20 DIAGNOSIS — Z98.0 INTESTINAL BYPASS OR ANASTOMOSIS STATUS: ICD-10-CM

## 2024-03-20 DIAGNOSIS — C18.7 MALIGNANT NEOPLASM OF SIGMOID COLON (MULTI): ICD-10-CM

## 2024-03-20 DIAGNOSIS — D12.2 BENIGN NEOPLASM OF ASCENDING COLON: ICD-10-CM

## 2024-03-20 PROCEDURE — 88305 TISSUE EXAM BY PATHOLOGIST: CPT | Performed by: STUDENT IN AN ORGANIZED HEALTH CARE EDUCATION/TRAINING PROGRAM

## 2024-03-20 PROCEDURE — 1036F TOBACCO NON-USER: CPT | Performed by: INTERNAL MEDICINE

## 2024-03-20 PROCEDURE — 45385 COLONOSCOPY W/LESION REMOVAL: CPT | Performed by: INTERNAL MEDICINE

## 2024-03-20 PROCEDURE — 4010F ACE/ARB THERAPY RXD/TAKEN: CPT | Performed by: INTERNAL MEDICINE

## 2024-03-20 PROCEDURE — 88305 TISSUE EXAM BY PATHOLOGIST: CPT

## 2024-03-26 LAB
LABORATORY COMMENT REPORT: NORMAL
PATH REPORT.FINAL DX SPEC: NORMAL
PATH REPORT.GROSS SPEC: NORMAL
PATH REPORT.RELEVANT HX SPEC: NORMAL
PATH REPORT.TOTAL CANCER: NORMAL

## 2024-03-27 NOTE — RESULT ENCOUNTER NOTE
One of the polyps removed from your colon was an adenoma (benign but precancerous).  The other polyps removed were not precancerous.  The recommendation is to repeat colonoscopy in 2 years.      Rhoda Haynes MD

## 2024-03-28 ENCOUNTER — HOSPITAL ENCOUNTER (OUTPATIENT)
Dept: RADIOLOGY | Facility: CLINIC | Age: 69
Discharge: HOME | End: 2024-03-28
Payer: MEDICARE

## 2024-03-28 ENCOUNTER — OFFICE VISIT (OUTPATIENT)
Dept: DERMATOLOGY | Facility: CLINIC | Age: 69
End: 2024-03-28
Payer: MEDICARE

## 2024-03-28 DIAGNOSIS — R60.9 EDEMA, UNSPECIFIED: ICD-10-CM

## 2024-03-28 DIAGNOSIS — L21.9 SEBORRHEIC DERMATITIS: Primary | ICD-10-CM

## 2024-03-28 DIAGNOSIS — K13.0 CHEILITIS: ICD-10-CM

## 2024-03-28 PROCEDURE — 73130 X-RAY EXAM OF HAND: CPT | Mod: LEFT SIDE | Performed by: STUDENT IN AN ORGANIZED HEALTH CARE EDUCATION/TRAINING PROGRAM

## 2024-03-28 PROCEDURE — 99213 OFFICE O/P EST LOW 20 MIN: CPT | Performed by: SPECIALIST

## 2024-03-28 PROCEDURE — 4010F ACE/ARB THERAPY RXD/TAKEN: CPT | Performed by: SPECIALIST

## 2024-03-28 PROCEDURE — 73130 X-RAY EXAM OF HAND: CPT | Mod: LT

## 2024-03-28 NOTE — PROGRESS NOTES
Subjective   HPI: Cosme Noel is a 69 y.o. male is here for evaluation and treatment of .  Scaling on my forehead and to check my lower lip.    ROS: No other skin or systemic complaints other than what is documented elsewhere in the note.    ALLERGIES: Bee venom protein (honey bee)    SOCIAL:  reports that he has quit smoking. His smoking use included cigarettes. He has never been exposed to tobacco smoke. He has never used smokeless tobacco. He reports current alcohol use of about 2.0 standard drinks of alcohol per week. He reports that he does not use drugs.    Objective     Description: Patient has patches of oily flaky dermatitis involving his forehead.  Patient's lower lip is healed very nicely without any sign of actinic cheilitis.    Assessment/Plan   1. Seborrheic dermatitis  Mid Forehead    Related Medications  ketoconazole-iodoquinoL-hc 2-1-2.5 % cream  Apply a thin layer to the affect area in the morning and at night    2. Cheilitis       Plan: SK N/V ketoconazole, iodoquinol hydrocortisone mix twice daily to scaly flaky areas of forehead and nose.  SPF 60 broad-spectrum water resistant sunscreen daily to face and lips.    FOLLOW UP: 2 weeks.    The patient was encouraged to contact me with any further questions or concerns.  Ankit Blanchard MD  3/28/2024

## 2024-05-08 ENCOUNTER — TELEPHONE (OUTPATIENT)
Dept: ENDOCRINOLOGY | Facility: CLINIC | Age: 69
End: 2024-05-08
Payer: MEDICARE

## 2024-05-08 NOTE — TELEPHONE ENCOUNTER
Pt called and LM with office that he was looking for a provider to take over his care. Returned call and LM with office contact details as well as central scheduling.

## 2024-05-23 ENCOUNTER — LAB (OUTPATIENT)
Dept: LAB | Facility: LAB | Age: 69
End: 2024-05-23
Payer: MEDICARE

## 2024-05-23 ENCOUNTER — OFFICE VISIT (OUTPATIENT)
Dept: ENDOCRINOLOGY | Facility: CLINIC | Age: 69
End: 2024-05-23
Payer: MEDICARE

## 2024-05-23 VITALS
HEART RATE: 78 BPM | HEIGHT: 69 IN | SYSTOLIC BLOOD PRESSURE: 132 MMHG | DIASTOLIC BLOOD PRESSURE: 82 MMHG | BODY MASS INDEX: 30.21 KG/M2 | WEIGHT: 204 LBS

## 2024-05-23 DIAGNOSIS — E11.9 TYPE 2 DIABETES MELLITUS WITHOUT COMPLICATION, UNSPECIFIED WHETHER LONG TERM INSULIN USE (MULTI): ICD-10-CM

## 2024-05-23 DIAGNOSIS — E11.9 TYPE 2 DIABETES MELLITUS WITHOUT COMPLICATION, UNSPECIFIED WHETHER LONG TERM INSULIN USE (MULTI): Primary | ICD-10-CM

## 2024-05-23 LAB
ALBUMIN SERPL BCP-MCNC: 4.4 G/DL (ref 3.4–5)
ALP SERPL-CCNC: 87 U/L (ref 33–136)
ALT SERPL W P-5'-P-CCNC: 43 U/L (ref 10–52)
ANION GAP SERPL CALC-SCNC: 14 MMOL/L (ref 10–20)
AST SERPL W P-5'-P-CCNC: 35 U/L (ref 9–39)
BILIRUB SERPL-MCNC: 0.5 MG/DL (ref 0–1.2)
BUN SERPL-MCNC: 20 MG/DL (ref 6–23)
CALCIUM SERPL-MCNC: 9.7 MG/DL (ref 8.6–10.3)
CHLORIDE SERPL-SCNC: 105 MMOL/L (ref 98–107)
CHOLEST SERPL-MCNC: 195 MG/DL (ref 0–199)
CHOLESTEROL/HDL RATIO: 4.6
CO2 SERPL-SCNC: 25 MMOL/L (ref 21–32)
CREAT SERPL-MCNC: 0.92 MG/DL (ref 0.5–1.3)
CREAT UR-MCNC: 237.3 MG/DL (ref 20–370)
EGFRCR SERPLBLD CKD-EPI 2021: 90 ML/MIN/1.73M*2
GLUCOSE SERPL-MCNC: 97 MG/DL (ref 74–99)
HDLC SERPL-MCNC: 42.2 MG/DL
LDLC SERPL CALC-MCNC: 90 MG/DL
MICROALBUMIN UR-MCNC: 65.1 MG/L
MICROALBUMIN/CREAT UR: 27.4 UG/MG CREAT
NON HDL CHOLESTEROL: 153 MG/DL (ref 0–149)
POC FINGERSTICK BLOOD GLUCOSE: 87 MG/DL (ref 70–100)
POTASSIUM SERPL-SCNC: 4.7 MMOL/L (ref 3.5–5.3)
PROT SERPL-MCNC: 6.5 G/DL (ref 6.4–8.2)
SODIUM SERPL-SCNC: 139 MMOL/L (ref 136–145)
TRIGL SERPL-MCNC: 315 MG/DL (ref 0–149)
VLDL: 63 MG/DL (ref 0–40)

## 2024-05-23 PROCEDURE — 80061 LIPID PANEL: CPT

## 2024-05-23 PROCEDURE — 4010F ACE/ARB THERAPY RXD/TAKEN: CPT | Performed by: INTERNAL MEDICINE

## 2024-05-23 PROCEDURE — 82043 UR ALBUMIN QUANTITATIVE: CPT

## 2024-05-23 PROCEDURE — 3079F DIAST BP 80-89 MM HG: CPT | Performed by: INTERNAL MEDICINE

## 2024-05-23 PROCEDURE — 3060F POS MICROALBUMINURIA REV: CPT | Performed by: INTERNAL MEDICINE

## 2024-05-23 PROCEDURE — 83036 HEMOGLOBIN GLYCOSYLATED A1C: CPT

## 2024-05-23 PROCEDURE — 36415 COLL VENOUS BLD VENIPUNCTURE: CPT

## 2024-05-23 PROCEDURE — 3044F HG A1C LEVEL LT 7.0%: CPT | Performed by: INTERNAL MEDICINE

## 2024-05-23 PROCEDURE — 82962 GLUCOSE BLOOD TEST: CPT | Performed by: INTERNAL MEDICINE

## 2024-05-23 PROCEDURE — 1160F RVW MEDS BY RX/DR IN RCRD: CPT | Performed by: INTERNAL MEDICINE

## 2024-05-23 PROCEDURE — 1159F MED LIST DOCD IN RCRD: CPT | Performed by: INTERNAL MEDICINE

## 2024-05-23 PROCEDURE — 3075F SYST BP GE 130 - 139MM HG: CPT | Performed by: INTERNAL MEDICINE

## 2024-05-23 PROCEDURE — 3048F LDL-C <100 MG/DL: CPT | Performed by: INTERNAL MEDICINE

## 2024-05-23 PROCEDURE — 80053 COMPREHEN METABOLIC PANEL: CPT

## 2024-05-23 PROCEDURE — 99214 OFFICE O/P EST MOD 30 MIN: CPT | Performed by: INTERNAL MEDICINE

## 2024-05-23 PROCEDURE — 82570 ASSAY OF URINE CREATININE: CPT

## 2024-05-23 ASSESSMENT — ENCOUNTER SYMPTOMS
SLEEP DISTURBANCE: 1
DIARRHEA: 1
FATIGUE: 1
APPETITE CHANGE: 1

## 2024-05-23 NOTE — PROGRESS NOTES
"Subjective   Cosme Noel is a 69 y.o. male who presents for follow-up of Type 2 diabetes mellitus. The initial diagnosis of diabetes was made in 2017  after undergoing chemotherapy.      He had left knee replacement in February 2024.  He is still in physical therapy.  He is still using a cane.      He is no longer on a probiotic, which he was taking in order to improve appetite in the morning.      Known complications due to diabetes included peripheral neuropathy    Cardiovascular risk factors include advanced age (older than 55 for men, 65 for women), diabetes mellitus, male gender, and microalbuminuria. The patient is on an ACE inhibitor or angiotensin II receptor blocker.  The patient has not been previously hospitalized due to diabetic ketoacidosis.     Current symptoms/problems include none. His clinical course has been stable.     The patient is currently checking the blood glucose but not on a consistent basis.    Previous medications tried:  Trulicity for three months      Current Medication Regimen  Metformin 1000mg twice daily   Farxiga 10mg once daily   Glimepiride 1mg twice daily      MEALS:   Breakfast - smoothie (berries, bananas, whey powder, milk, peanut butter, flax seed powder)  Lunch - can omit   Dinner - salad, red meats, soups  Snacks - cheese  Beverages - water, sun tea (unsweetened); he has not had beer in five weeks     Exercise regimen - increased physical activity; joined [employer] - 3 times per week; weight lifting      Review of Systems   Constitutional:  Positive for appetite change (Lowered) and fatigue (At 7pm).   Gastrointestinal:  Positive for diarrhea (When he does not eat at take metformin).   Psychiatric/Behavioral:  Positive for sleep disturbance (Wakes up after 3 hours).    All other systems reviewed and are negative.    Objective   /82 (BP Location: Left arm, Patient Position: Sitting, BP Cuff Size: Adult)   Pulse 78   Ht 1.753 m (5' 9\")   Wt 92.5 kg (204 " lb)   BMI 30.13 kg/m²   Physical Exam  Vitals and nursing note reviewed.   Constitutional:       General: He is not in acute distress.     Appearance: Normal appearance. He is normal weight.   HENT:      Head: Normocephalic and atraumatic.      Nose: Nose normal.      Mouth/Throat:      Mouth: Mucous membranes are moist.   Eyes:      Extraocular Movements: Extraocular movements intact.   Pulmonary:      Effort: Pulmonary effort is normal.      Breath sounds: Normal breath sounds.   Musculoskeletal:         General: Normal range of motion.   Neurological:      Mental Status: He is alert and oriented to person, place, and time.   Psychiatric:      Comments: Despondent          Lab Review  Glucose (mg/dL)   Date Value   12/06/2023 156 (H)   06/20/2023 140 (H)   06/07/2023 166 (H)   05/15/2023 187 (H)     POC HEMOGLOBIN A1c (%)   Date Value   12/05/2023 7.6 (A)     Hemoglobin A1C (%)   Date Value   06/27/2023 7.6 (H)   06/20/2023 7.8 (A)   05/15/2023 9.2 (A)   03/14/2022 8.4 (A)     Bicarbonate (mmol/L)   Date Value   12/06/2023 27   06/20/2023 22   06/07/2023 21   05/15/2023 27     Urea Nitrogen (mg/dL)   Date Value   12/06/2023 22   06/20/2023 28 (H)   06/07/2023 34 (H)   05/15/2023 27 (H)     Creatinine (mg/dL)   Date Value   12/06/2023 0.90   06/20/2023 0.93   06/07/2023 0.99   05/15/2023 0.91     Lab Results   Component Value Date    CHOL 195 05/23/2024    CHOL 173 06/20/2023    CHOL 173 05/15/2023     Lab Results   Component Value Date    HDL 42.2 05/23/2024    HDL 38.7 (A) 06/20/2023    HDL 45.8 05/15/2023     Lab Results   Component Value Date    LDLCALC 90 05/23/2024     Lab Results   Component Value Date    TRIG 315 (H) 05/23/2024    TRIG 186 (H) 06/20/2023    TRIG 348 (H) 05/15/2023       Health Maintenance:   Foot Exam: August 14, 2023  Eye Exam:  November 2022  Urine Albumin:  May 15, 2023    Assessment/Plan   69-year-old male presents for follow up for type 2 diabetes mellitus. His blood pressure is at  goal. He is noted to be on an ACE inhibitor.    Type 2 diabetes mellitus (Multi)  To continue Metformin 1000mg twice daily   To continue Farxiga 10mg once daily   To continue Glimepiride 1mg twice daily   To obtain blood and urine tests today   Please check blood sugars 1-2 times daily and keep a detailed log  For follow up in 5 months

## 2024-05-24 LAB
EST. AVERAGE GLUCOSE BLD GHB EST-MCNC: 131 MG/DL
HBA1C MFR BLD: 6.2 %

## 2024-05-25 DIAGNOSIS — E11.9 TYPE 2 DIABETES MELLITUS WITHOUT COMPLICATION, UNSPECIFIED WHETHER LONG TERM INSULIN USE (MULTI): ICD-10-CM

## 2024-05-25 DIAGNOSIS — E78.1 HYPERTRIGLYCERIDEMIA: Primary | ICD-10-CM

## 2024-05-25 RX ORDER — ATORVASTATIN CALCIUM 20 MG/1
20 TABLET, FILM COATED ORAL DAILY
Qty: 30 TABLET | Refills: 11 | Status: SHIPPED | OUTPATIENT
Start: 2024-05-25 | End: 2025-05-25

## 2024-05-25 NOTE — RESULT ENCOUNTER NOTE
Labs have been reviewed.  The A1C has improved nicely to 6.2%.  The triglyceride value has elevated above goal.  Please start on Atorvastatin 20mg once daily in order to improve this and lower risk of cardiac disease.  The urine, kidney and liver functions are normal.  For follow up as scheduled with repeat labs.

## 2024-05-26 NOTE — ASSESSMENT & PLAN NOTE
To continue Metformin 1000mg twice daily   To continue Farxiga 10mg once daily   To continue Glimepiride 1mg twice daily   To obtain blood and urine tests today   Please check blood sugars 1-2 times daily and keep a detailed log  For follow up in 5 months

## 2024-06-17 DIAGNOSIS — E11.9 TYPE 2 DIABETES MELLITUS WITHOUT COMPLICATION, UNSPECIFIED WHETHER LONG TERM INSULIN USE (MULTI): ICD-10-CM

## 2024-06-17 RX ORDER — GLIMEPIRIDE 1 MG/1
1 TABLET ORAL 2 TIMES DAILY
Qty: 180 TABLET | Refills: 1 | Status: SHIPPED | OUTPATIENT
Start: 2024-06-17 | End: 2024-12-14

## 2024-06-17 RX ORDER — DAPAGLIFLOZIN 10 MG/1
10 TABLET, FILM COATED ORAL DAILY
Qty: 90 TABLET | Refills: 1 | Status: SHIPPED | OUTPATIENT
Start: 2024-06-17 | End: 2024-12-14

## 2024-06-17 NOTE — TELEPHONE ENCOUNTER
----- Message from Cosme Noel sent at 6/15/2024 10:12 AM EDT -----  Regarding: Refills   Contact: [phone]  I am now running out of the Farxiga & Glimepiride , w/ no refills 4 left . I am assuming that I am to stay on these . Please send refill orders into the pharmacy @  [facility] in [place]  . Please / Thank you

## 2024-07-09 ENCOUNTER — APPOINTMENT (OUTPATIENT)
Dept: PRIMARY CARE | Facility: CLINIC | Age: 69
End: 2024-07-09
Payer: MEDICARE

## 2024-07-09 VITALS
HEIGHT: 70 IN | DIASTOLIC BLOOD PRESSURE: 72 MMHG | WEIGHT: 204 LBS | BODY MASS INDEX: 29.2 KG/M2 | SYSTOLIC BLOOD PRESSURE: 132 MMHG | HEART RATE: 87 BPM

## 2024-07-09 DIAGNOSIS — E11.42 TYPE 2 DIABETES MELLITUS WITH DIABETIC POLYNEUROPATHY, WITHOUT LONG-TERM CURRENT USE OF INSULIN (MULTI): ICD-10-CM

## 2024-07-09 DIAGNOSIS — Z00.00 ENCOUNTER FOR ANNUAL WELLNESS EXAM IN MEDICARE PATIENT: Primary | ICD-10-CM

## 2024-07-09 DIAGNOSIS — E55.9 VITAMIN D DEFICIENCY: ICD-10-CM

## 2024-07-09 DIAGNOSIS — R53.83 OTHER FATIGUE: ICD-10-CM

## 2024-07-09 DIAGNOSIS — Z87.891 FORMER SMOKER: ICD-10-CM

## 2024-07-09 PROCEDURE — 4010F ACE/ARB THERAPY RXD/TAKEN: CPT | Performed by: STUDENT IN AN ORGANIZED HEALTH CARE EDUCATION/TRAINING PROGRAM

## 2024-07-09 PROCEDURE — 1157F ADVNC CARE PLAN IN RCRD: CPT | Performed by: STUDENT IN AN ORGANIZED HEALTH CARE EDUCATION/TRAINING PROGRAM

## 2024-07-09 PROCEDURE — 3060F POS MICROALBUMINURIA REV: CPT | Performed by: STUDENT IN AN ORGANIZED HEALTH CARE EDUCATION/TRAINING PROGRAM

## 2024-07-09 PROCEDURE — 3048F LDL-C <100 MG/DL: CPT | Performed by: STUDENT IN AN ORGANIZED HEALTH CARE EDUCATION/TRAINING PROGRAM

## 2024-07-09 PROCEDURE — 1036F TOBACCO NON-USER: CPT | Performed by: STUDENT IN AN ORGANIZED HEALTH CARE EDUCATION/TRAINING PROGRAM

## 2024-07-09 PROCEDURE — 1158F ADVNC CARE PLAN TLK DOCD: CPT | Performed by: STUDENT IN AN ORGANIZED HEALTH CARE EDUCATION/TRAINING PROGRAM

## 2024-07-09 PROCEDURE — 1159F MED LIST DOCD IN RCRD: CPT | Performed by: STUDENT IN AN ORGANIZED HEALTH CARE EDUCATION/TRAINING PROGRAM

## 2024-07-09 PROCEDURE — 1170F FXNL STATUS ASSESSED: CPT | Performed by: STUDENT IN AN ORGANIZED HEALTH CARE EDUCATION/TRAINING PROGRAM

## 2024-07-09 PROCEDURE — 1123F ACP DISCUSS/DSCN MKR DOCD: CPT | Performed by: STUDENT IN AN ORGANIZED HEALTH CARE EDUCATION/TRAINING PROGRAM

## 2024-07-09 PROCEDURE — 3075F SYST BP GE 130 - 139MM HG: CPT | Performed by: STUDENT IN AN ORGANIZED HEALTH CARE EDUCATION/TRAINING PROGRAM

## 2024-07-09 PROCEDURE — 1160F RVW MEDS BY RX/DR IN RCRD: CPT | Performed by: STUDENT IN AN ORGANIZED HEALTH CARE EDUCATION/TRAINING PROGRAM

## 2024-07-09 PROCEDURE — 3044F HG A1C LEVEL LT 7.0%: CPT | Performed by: STUDENT IN AN ORGANIZED HEALTH CARE EDUCATION/TRAINING PROGRAM

## 2024-07-09 PROCEDURE — 3078F DIAST BP <80 MM HG: CPT | Performed by: STUDENT IN AN ORGANIZED HEALTH CARE EDUCATION/TRAINING PROGRAM

## 2024-07-09 PROCEDURE — G0439 PPPS, SUBSEQ VISIT: HCPCS | Performed by: STUDENT IN AN ORGANIZED HEALTH CARE EDUCATION/TRAINING PROGRAM

## 2024-07-09 ASSESSMENT — PATIENT HEALTH QUESTIONNAIRE - PHQ9
SUM OF ALL RESPONSES TO PHQ9 QUESTIONS 1 AND 2: 0
2. FEELING DOWN, DEPRESSED OR HOPELESS: NOT AT ALL
1. LITTLE INTEREST OR PLEASURE IN DOING THINGS: NOT AT ALL
SUM OF ALL RESPONSES TO PHQ9 QUESTIONS 1 AND 2: 0
2. FEELING DOWN, DEPRESSED OR HOPELESS: NOT AT ALL
1. LITTLE INTEREST OR PLEASURE IN DOING THINGS: NOT AT ALL

## 2024-07-09 ASSESSMENT — ACTIVITIES OF DAILY LIVING (ADL)
DRESSING: INDEPENDENT
BATHING: INDEPENDENT
GROCERY_SHOPPING: INDEPENDENT
TAKING_MEDICATION: INDEPENDENT
MANAGING_FINANCES: INDEPENDENT
DOING_HOUSEWORK: INDEPENDENT

## 2024-07-09 ASSESSMENT — ENCOUNTER SYMPTOMS
DEPRESSION: 0
OCCASIONAL FEELINGS OF UNSTEADINESS: 0
LOSS OF SENSATION IN FEET: 0

## 2024-07-09 NOTE — PROGRESS NOTES
"Subjective   Reason for Visit: Cosme Noel is an 69 y.o. male here for a Medicare Wellness visit.     Past Medical, Surgical, and Family History reviewed and updated in chart.    Reviewed all medications by prescribing practitioner or clinical pharmacist (such as prescriptions, OTCs, herbal therapies and supplements) and documented in the medical record.    HPI  He sees endocrinology for his diabetes. Well controlled    He is asking for testosterone and vit D check for fatigue since they haven't been checked in a long time    Needs no refills. Hasn't seen a PCP for at least 3 years.    Okay with AAA screen, but wants to think about pneumo vaccine    Patient Self Assessment of Health Status  Patient Self Assessment: Fair    Nutrition and Exercise  Current Diet: Well Balanced Diet  Adequate Fluid Intake: Yes  Caffeine: Yes  Exercise Frequency: Regularly    Functional Ability/Level of Safety  Cognitive Impairment Observed: No cognitive impairment observed  Cognitive Impairment Reported: No cognitive impairment reported by patient or family    Home Safety Risk Factors: None    Patient Care Team:  Marika Hammond DO as PCP - General (Family Medicine)  Val Truong MD as Endocrinologist (Endocrinology)  Jayson Florian MD as Consulting Physician (Hematology and Oncology)     Review of Systems  All pertinent positive symptoms are included in history of present illness.    All other systems have been reviewed and are negative and noncontributory to this patient's current ailments.    Objective   Vitals:  /72   Pulse 87   Ht 1.765 m (5' 9.5\")   Wt 92.5 kg (204 lb)   BMI 29.69 kg/m²       Physical Exam  CONSTITUTIONAL - INAD. Not ill appearing.  SKIN - No lesions or rashes visualized. Good skin turgor.  HEAD - Atraumatic, normocephalic.  RESP - CTAB. No wheezing, rhonchi, or crackles.   CARDIAC - RRR. No murmurs, gallops, or rubs.  ABDOMEN - Soft, nontender, nondistended. No organomegaly. "   PSYCH - Appropriate mood and affect.    Assessment/Plan   Diagnoses and all orders for this visit:  Encounter for annual wellness exam in Medicare patient  Age-appropriate screenings are up-to-date other than AAA screening, which was ordered today  He is going to think about pneumonia vaccination and will come back  Type 2 diabetes mellitus with diabetic polyneuropathy, without long-term current use of insulin (Multi)  Well-controlled, sees endocrinology, no concerns today  Vitamin D deficiency  -     Vitamin D 25-Hydroxy,Total (for eval of Vitamin D levels); Future  Other fatigue  -     Testosterone; Future  -     TSH with reflex to Free T4 if abnormal; Future  Former smoker  -     Vascular US abdominal aorta anuerysm AAA screening; Future    Return in a year for Medicare wellness

## 2024-08-26 DIAGNOSIS — E11.9 TYPE 2 DIABETES MELLITUS WITHOUT COMPLICATION, UNSPECIFIED WHETHER LONG TERM INSULIN USE (MULTI): ICD-10-CM

## 2024-08-26 RX ORDER — LISINOPRIL 20 MG/1
20 TABLET ORAL DAILY
Qty: 90 TABLET | Refills: 1 | Status: SHIPPED | OUTPATIENT
Start: 2024-08-26 | End: 2025-02-22

## 2024-08-26 RX ORDER — METFORMIN HYDROCHLORIDE 500 MG/1
1000 TABLET, EXTENDED RELEASE ORAL 2 TIMES DAILY
Qty: 360 TABLET | Refills: 1 | Status: SHIPPED | OUTPATIENT
Start: 2024-08-26 | End: 2025-02-22

## 2024-08-26 NOTE — TELEPHONE ENCOUNTER
Patient attempted to refill lisinopril and metformin and was notified that rx was . He asks that renewal be sent as soon as possible.    Patient also wanted to inform Dr. Truong that he has scheduled with new PCP.    He can not afford farxiga price increase. An AZ&ME maliha has been printed for  and will be at .

## 2024-08-29 ENCOUNTER — APPOINTMENT (OUTPATIENT)
Dept: DERMATOLOGY | Facility: CLINIC | Age: 69
End: 2024-08-29
Payer: MEDICARE

## 2024-09-26 ENCOUNTER — LAB (OUTPATIENT)
Dept: LAB | Facility: CLINIC | Age: 69
End: 2024-09-26
Payer: MEDICARE

## 2024-09-26 DIAGNOSIS — C18.7 MALIGNANT NEOPLASM OF SIGMOID COLON (MULTI): ICD-10-CM

## 2024-09-26 DIAGNOSIS — E55.9 VITAMIN D DEFICIENCY: ICD-10-CM

## 2024-09-26 DIAGNOSIS — E11.9 TYPE 2 DIABETES MELLITUS WITHOUT COMPLICATION, UNSPECIFIED WHETHER LONG TERM INSULIN USE (MULTI): Primary | ICD-10-CM

## 2024-09-26 DIAGNOSIS — E11.9 TYPE 2 DIABETES MELLITUS WITHOUT COMPLICATION, UNSPECIFIED WHETHER LONG TERM INSULIN USE (MULTI): ICD-10-CM

## 2024-09-26 DIAGNOSIS — R53.83 OTHER FATIGUE: ICD-10-CM

## 2024-09-26 LAB
25(OH)D3 SERPL-MCNC: 30 NG/ML (ref 30–100)
ALBUMIN SERPL BCP-MCNC: 4.6 G/DL (ref 3.4–5)
ALP SERPL-CCNC: 98 U/L (ref 33–136)
ALT SERPL W P-5'-P-CCNC: 40 U/L (ref 10–52)
ANION GAP SERPL CALC-SCNC: 16 MMOL/L (ref 10–20)
AST SERPL W P-5'-P-CCNC: 33 U/L (ref 9–39)
BASOPHILS # BLD AUTO: 0.05 X10*3/UL (ref 0–0.1)
BASOPHILS NFR BLD AUTO: 0.8 %
BILIRUB SERPL-MCNC: 0.4 MG/DL (ref 0–1.2)
BUN SERPL-MCNC: 28 MG/DL (ref 6–23)
CALCIUM SERPL-MCNC: 9.8 MG/DL (ref 8.6–10.6)
CEA SERPL-MCNC: 0.8 UG/L
CHLORIDE SERPL-SCNC: 105 MMOL/L (ref 98–107)
CHOLEST SERPL-MCNC: 180 MG/DL (ref 0–199)
CHOLESTEROL/HDL RATIO: 4.2
CO2 SERPL-SCNC: 23 MMOL/L (ref 21–32)
CREAT SERPL-MCNC: 0.97 MG/DL (ref 0.5–1.3)
EGFRCR SERPLBLD CKD-EPI 2021: 85 ML/MIN/1.73M*2
EOSINOPHIL # BLD AUTO: 0.1 X10*3/UL (ref 0–0.7)
EOSINOPHIL NFR BLD AUTO: 1.7 %
ERYTHROCYTE [DISTWIDTH] IN BLOOD BY AUTOMATED COUNT: 13.2 % (ref 11.5–14.5)
EST. AVERAGE GLUCOSE BLD GHB EST-MCNC: 123 MG/DL
GLUCOSE SERPL-MCNC: 109 MG/DL (ref 74–99)
HBA1C MFR BLD: 5.9 %
HCT VFR BLD AUTO: 46.1 % (ref 41–52)
HDLC SERPL-MCNC: 42.7 MG/DL
HGB BLD-MCNC: 15.3 G/DL (ref 13.5–17.5)
IMM GRANULOCYTES # BLD AUTO: 0.01 X10*3/UL (ref 0–0.7)
IMM GRANULOCYTES NFR BLD AUTO: 0.2 % (ref 0–0.9)
LDLC SERPL CALC-MCNC: 94 MG/DL
LYMPHOCYTES # BLD AUTO: 1.98 X10*3/UL (ref 1.2–4.8)
LYMPHOCYTES NFR BLD AUTO: 32.8 %
MCH RBC QN AUTO: 30.7 PG (ref 26–34)
MCHC RBC AUTO-ENTMCNC: 33.2 G/DL (ref 32–36)
MCV RBC AUTO: 93 FL (ref 80–100)
MONOCYTES # BLD AUTO: 0.55 X10*3/UL (ref 0.1–1)
MONOCYTES NFR BLD AUTO: 9.1 %
NEUTROPHILS # BLD AUTO: 3.34 X10*3/UL (ref 1.2–7.7)
NEUTROPHILS NFR BLD AUTO: 55.4 %
NON HDL CHOLESTEROL: 137 MG/DL (ref 0–149)
NRBC BLD-RTO: NORMAL /100{WBCS}
PLATELET # BLD AUTO: 175 X10*3/UL (ref 150–450)
POTASSIUM SERPL-SCNC: 4.7 MMOL/L (ref 3.5–5.3)
PROT SERPL-MCNC: 6.8 G/DL (ref 6.4–8.2)
RBC # BLD AUTO: 4.98 X10*6/UL (ref 4.5–5.9)
SODIUM SERPL-SCNC: 139 MMOL/L (ref 136–145)
TESTOST SERPL-MCNC: 393 NG/DL (ref 240–1000)
TRIGL SERPL-MCNC: 219 MG/DL (ref 0–149)
TSH SERPL-ACNC: 1.87 MIU/L (ref 0.44–3.98)
VLDL: 44 MG/DL (ref 0–40)
WBC # BLD AUTO: 6 X10*3/UL (ref 4.4–11.3)

## 2024-09-26 PROCEDURE — 36415 COLL VENOUS BLD VENIPUNCTURE: CPT

## 2024-09-26 PROCEDURE — 84443 ASSAY THYROID STIM HORMONE: CPT

## 2024-09-26 PROCEDURE — 85025 COMPLETE CBC W/AUTO DIFF WBC: CPT

## 2024-09-26 PROCEDURE — 82378 CARCINOEMBRYONIC ANTIGEN: CPT

## 2024-09-26 PROCEDURE — 82306 VITAMIN D 25 HYDROXY: CPT

## 2024-09-26 PROCEDURE — 80061 LIPID PANEL: CPT

## 2024-09-26 PROCEDURE — 84403 ASSAY OF TOTAL TESTOSTERONE: CPT

## 2024-09-26 PROCEDURE — 83036 HEMOGLOBIN GLYCOSYLATED A1C: CPT

## 2024-09-26 PROCEDURE — 80053 COMPREHEN METABOLIC PANEL: CPT

## 2024-09-27 ENCOUNTER — APPOINTMENT (OUTPATIENT)
Dept: ENDOCRINOLOGY | Facility: CLINIC | Age: 69
End: 2024-09-27
Payer: MEDICARE

## 2024-10-01 ENCOUNTER — OFFICE VISIT (OUTPATIENT)
Dept: HEMATOLOGY/ONCOLOGY | Facility: CLINIC | Age: 69
End: 2024-10-01
Payer: MEDICARE

## 2024-10-01 VITALS
TEMPERATURE: 97.3 F | BODY MASS INDEX: 31 KG/M2 | OXYGEN SATURATION: 97 % | WEIGHT: 212.96 LBS | RESPIRATION RATE: 18 BRPM | SYSTOLIC BLOOD PRESSURE: 126 MMHG | DIASTOLIC BLOOD PRESSURE: 79 MMHG | HEART RATE: 71 BPM

## 2024-10-01 DIAGNOSIS — C18.7 MALIGNANT NEOPLASM OF SIGMOID COLON (MULTI): ICD-10-CM

## 2024-10-01 PROCEDURE — 99213 OFFICE O/P EST LOW 20 MIN: CPT | Performed by: INTERNAL MEDICINE

## 2024-10-01 PROCEDURE — 3078F DIAST BP <80 MM HG: CPT | Performed by: INTERNAL MEDICINE

## 2024-10-01 PROCEDURE — 4010F ACE/ARB THERAPY RXD/TAKEN: CPT | Performed by: INTERNAL MEDICINE

## 2024-10-01 PROCEDURE — 3048F LDL-C <100 MG/DL: CPT | Performed by: INTERNAL MEDICINE

## 2024-10-01 PROCEDURE — 1125F AMNT PAIN NOTED PAIN PRSNT: CPT | Performed by: INTERNAL MEDICINE

## 2024-10-01 PROCEDURE — 3044F HG A1C LEVEL LT 7.0%: CPT | Performed by: INTERNAL MEDICINE

## 2024-10-01 PROCEDURE — 3060F POS MICROALBUMINURIA REV: CPT | Performed by: INTERNAL MEDICINE

## 2024-10-01 PROCEDURE — 1036F TOBACCO NON-USER: CPT | Performed by: INTERNAL MEDICINE

## 2024-10-01 PROCEDURE — 3074F SYST BP LT 130 MM HG: CPT | Performed by: INTERNAL MEDICINE

## 2024-10-01 PROCEDURE — 1159F MED LIST DOCD IN RCRD: CPT | Performed by: INTERNAL MEDICINE

## 2024-10-01 PROCEDURE — 1157F ADVNC CARE PLAN IN RCRD: CPT | Performed by: INTERNAL MEDICINE

## 2024-10-01 ASSESSMENT — PAIN SCALES - GENERAL: PAINLEVEL: 3

## 2024-10-01 ASSESSMENT — ENCOUNTER SYMPTOMS
DEPRESSION: 0
OCCASIONAL FEELINGS OF UNSTEADINESS: 0
LOSS OF SENSATION IN FEET: 0

## 2024-10-01 NOTE — PROGRESS NOTES
Patient Visit Information:   Visit Type: Follow Up Visit      Cancer History:          Digestive System-Colon and Rectum         AJCC Edition: 7th (AJCC), Diagnosis Date: 13-Dec-2016, IIIB, T3 N1b M0      Treatment Synopsis:    Patient is a 65 year old male who underwent a colonoscopy in 2008 at which time he was found to have a colonic adenocarcinoma arising in a pedunculated polyp.  He has since been followed intermittently with colonoscopy but noted in October 2016 to have some blood in his stool. He thought this was hemorrhoids but underwent a colonoscopy 10/28/16 that showed the presence of a cancer. He then underwent a laproscopic  proctosigmoidectomy with a coloanal anastomosis on 12/13/16 with pathology showed a low grade adenocarcinoma, 4.5cm in size, margins were negative, +perinerual and lymphovascular invasion, 2 tumor deposits were seen and 3/18 lymph nodes were involved.  Overall this was consistent with a [id number], stage IIIB colon cancer. MMR was intact.   CEA at diagnosis elevated to 4.9.       1/24/17: adjuvant FOLFOX initiated, last dose received on 6/27/17.  10/9/17: Colonoscopy 2 polyps excised = adenoma   12/15/17: Annual CT scans: No evidence of disease. CEA level = <0.5  10/2018: C-scope with Dr. Álvarez showed 2 polyps in ascending colon. Path: tubular adenomas  11/4/19: C-scope with Dr. Álvarez showed 1 polyp at ileocecal valve.  Path: lymphoid tissue & mild hyperplasia  12/2018: CT with R[facility]OLPH. CEA 1  12/2019: CEA 0.6, CT with [facility]      History of Present Illness:      ID Statement:    CRISTI NOEL is a 67 year old Male        Chief Complaint: colon cancer  / melanoma   surveillance visit   Interval History:    Mr. Noel is seen in follow up for his stage III colon cancer and stage I melanoma.     He is 7 years out from colon cancer surgery.   3 years out from melanoma resection.      EGD and colonoscopy in done in Dec 2022.  1 sessile polyp removed.       Colonoscopy scheduled for 3/2023.       Seeing Dr. Blanchard of dermatology every 6 months.    Continues to suffer with neuropathy after chemo - feels like its getting slightly worse at times     Physical Exam:    did not stay for exam     WBC   Date/Time Value Ref Range Status   09/26/2024 08:57 AM 6.0 4.4 - 11.3 x10*3/uL Final   12/06/2023 01:37 PM 8.0 4.4 - 11.3 x10*3/uL Final   06/07/2023 08:43 AM 6.2 4.4 - 11.3 x10E9/L Final   12/06/2022 09:53 AM 6.1 4.4 - 11.3 x10E9/L Final   06/03/2022 09:57 AM 7.7 4.4 - 11.3 x10E9/L Final     Hemoglobin   Date Value Ref Range Status   09/26/2024 15.3 13.5 - 17.5 g/dL Final   12/06/2023 15.1 13.5 - 17.5 g/dL Final   06/07/2023 14.3 13.5 - 17.5 g/dL Final   12/06/2022 14.7 13.5 - 17.5 g/dL Final   06/03/2022 15.0 13.5 - 17.5 g/dL Final     MCV   Date/Time Value Ref Range Status   09/26/2024 08:57 AM 93 80 - 100 fL Final   12/06/2023 01:37 PM 91 80 - 100 fL Final   06/07/2023 08:43 AM 91 80 - 100 fL Final   12/06/2022 09:53 AM 92 80 - 100 fL Final   06/03/2022 09:57 AM 92 80 - 100 fL Final     Platelets   Date/Time Value Ref Range Status   09/26/2024 08:57  150 - 450 x10*3/uL Final   12/06/2023 01:37  150 - 450 x10*3/uL Final   06/07/2023 08:43  150 - 450 x10E9/L Final   12/06/2022 09:53  150 - 450 x10E9/L Final   06/03/2022 09:57  150 - 450 x10E9/L Final     Neutrophils Absolute   Date/Time Value Ref Range Status   09/26/2024 08:57 AM 3.34 1.20 - 7.70 x10*3/uL Final     Comment:     Percent differential counts (%) should be interpreted in the context of the absolute cell counts (cells/uL).   12/06/2023 01:37 PM 5.24 1.20 - 7.70 x10*3/uL Final     Comment:     Percent differential counts (%) should be interpreted in the context of the absolute cell counts (cells/uL).   06/07/2023 08:43 AM 3.66 1.20 - 7.70 x10E9/L Final     Comment:      Percent differential counts (%) should be interpreted in the   context of the absolute cell counts (cells/L).     12/06/2022 09:53 AM 3.50 1.20 - 7.70  "x10E9/L Final     Comment:      Percent differential counts (%) should be interpreted in the   context of the absolute cell counts (cells/L).     06/03/2022 09:57 AM 4.90 1.20 - 7.70 x10E9/L Final     Comment:      Percent differential counts (%) should be interpreted in the   context of the absolute cell counts (cells/L).       Bilirubin, Total   Date/Time Value Ref Range Status   09/26/2024 08:57 AM 0.4 0.0 - 1.2 mg/dL Final   05/23/2024 09:53 AM 0.5 0.0 - 1.2 mg/dL Final   12/06/2023 01:37 PM 0.5 0.0 - 1.2 mg/dL Final     AST   Date/Time Value Ref Range Status   09/26/2024 08:57 AM 33 9 - 39 U/L Final   05/23/2024 09:53 AM 35 9 - 39 U/L Final   12/06/2023 01:37 PM 26 9 - 39 U/L Final     ALT   Date/Time Value Ref Range Status   09/26/2024 08:57 AM 40 10 - 52 U/L Final     Comment:     Patients treated with Sulfasalazine may generate falsely decreased results for ALT.   05/23/2024 09:53 AM 43 10 - 52 U/L Final     Comment:     Patients treated with Sulfasalazine may generate falsely decreased results for ALT.   12/06/2023 01:37 PM 31 10 - 52 U/L Final     Comment:     Patients treated with Sulfasalazine may generate falsely decreased results for ALT.     Creatinine   Date/Time Value Ref Range Status   09/26/2024 08:57 AM 0.97 0.50 - 1.30 mg/dL Final   05/23/2024 09:53 AM 0.92 0.50 - 1.30 mg/dL Final   12/06/2023 01:37 PM 0.90 0.50 - 1.30 mg/dL Final     Urea Nitrogen   Date/Time Value Ref Range Status   09/26/2024 08:57 AM 28 (H) 6 - 23 mg/dL Final   05/23/2024 09:53 AM 20 6 - 23 mg/dL Final   12/06/2023 01:37 PM 22 6 - 23 mg/dL Final     Albumin   Date/Time Value Ref Range Status   09/26/2024 08:57 AM 4.6 3.4 - 5.0 g/dL Final   05/23/2024 09:53 AM 4.4 3.4 - 5.0 g/dL Final   12/06/2023 01:37 PM 4.8 3.4 - 5.0 g/dL Final     No results found for: \"\"  Carcinoembryonic AG   Date/Time Value Ref Range Status   09/26/2024 08:57 AM 0.8 ug/L Final   12/06/2023 01:37 PM 0.9 ug/L Final       === 12/08/22 ===    CT CHEST " ABDOMEN PELVIS W IV CONTRAST    - Impression -  CHEST  1.  Unremarkable exam    ABDOMEN - PELVIS  1.  Fatty infiltration of the liver. Otherwise unremarkable without  evidence of metastatic disease or interval change.       Assessment and Plan:   Assessment:    Patient is a 69 y.o.  male with a history of stage I Crc s/p endoscopic resection.   stage IIIB colon cancer (12/2016) who presents today for followup. He completed adjuvant chemotherapy in June 2017.    Now 7 years out from surgical resection. CEA level is normal.  Up to date on colonoscopy - Dec 2022( 1 polyp)   CT on 12/3/22 showed no evidence of malignancy.        He also was found to have an invasive melanoma on his R. torso 11/19/20.  Stage pTIa.  Depth 5mm. Margins positive.  Now on surveillance with Derm every months.     Plan:  CRC surveillance:   -He has met 7 year morris out from surgery   - no further imaging or labs needed.        -He prefers annual colonoscopy with Dr. Álvarez - last one Dec 2022 she is on sabbatical and may not be coming back to clinical medicine.   - Had colonoscopy 3/2024 with 1 ap and 1 hp polyp.     - Colonoscopy planned for 9/2025.    - Follow up in ~12 months.  Will continue to follow given history of multiple cancers.      Melanoma, T1N0M0, s/p wle 11/2020, [facility]:  Second rescecton 4/2023 [facility].    -Per NCCN guidelines - needs every 6 mo H & P for early stage melanoma - no scans needed   -Continue derm follow up for skin checks, q6months   -Discussed he has completed surveillance regimen    -Will need to continue colonoscopy & dermatology follow up.     He did not want to have surveillance with NP & prefers follow up with MD

## 2024-10-24 NOTE — PATIENT INSTRUCTIONS
Thank you for choosing [facility]  for your health care needs.  If you have any questions, concerns or medical needs, please feel free to contact our office at [phone].    Please ensure you complete your blood work one week before the next scheduled appointment.    To decrease Metformin to 500mg in the morning and 1000mg with dinner    To continue Farxiga 10mg once daily   To continue Glimepiride 1mg twice daily   To obtain blood and urine tests before the next appointment  Counseled that the goal A1C should be 7% or less and thus you are at goal  Counseled glycemic control is warranted to prevent microvascular complications  Counseled that the fasting triglyceride level should be less than 150  To update regarding sugar values in the next 3 weeks   For follow up in 6 months

## 2024-10-24 NOTE — PROGRESS NOTES
Subjective   Cosme Noel is a 69 y.o. male who presents for follow-up of Type 2 diabetes mellitus. The initial diagnosis of diabetes was made in 2017  after undergoing chemotherapy.      He tested positive for COVID-19 this morning.      Known complications due to diabetes included peripheral neuropathy and obesity    Cardiovascular risk factors include advanced age (older than 55 for men, 65 for women), diabetes mellitus, male gender, microalbuminuria, and obesity (BMI >= 30 kg/m2). The patient is on an ACE inhibitor or angiotensin II receptor blocker.  The patient has not been previously hospitalized due to diabetic ketoacidosis.     Current symptoms/problems include none. His clinical course has been stable.     The patient is currently checking the blood glucose but not on a consistent basis.   100-123mg/dL     Hypoglycemic episodes: denies; lowest value was in the 90s  Hypoglycemic symptoms: N/A     Previous medications tried:  Trulicity for three months      Current Medication Regimen  Metformin 1000mg twice daily   Farxiga 10mg once daily   Glimepiride 1mg twice daily      MEALS:   Breakfast - grain cereal  Lunch - can omit   Dinner - eggplant with pasta   Snacks - cheese  Beverages - water, sun tea (unsweetened); he has not had beer in five weeks     Exercise regimen - increased physical activity/intensity; weight lifting      Review of Systems   Constitutional:  Positive for appetite change (Decreased).   Respiratory:  Positive for cough.    Musculoskeletal:  Positive for arthralgias.   Neurological:  Positive for headaches. Negative for weakness.   Psychiatric/Behavioral:  Positive for sleep disturbance.      Objective   There were no vitals taken for this visit.  Physical Exam  Psychiatric:      Comments: Despondent          Lab Review  Glucose (mg/dL)   Date Value   09/26/2024 109 (H)   05/23/2024 97   12/06/2023 156 (H)     POC HEMOGLOBIN A1c (%)   Date Value   12/05/2023 7.6 (A)     Hemoglobin A1C  (%)   Date Value   09/26/2024 5.9 (H)   05/23/2024 6.2 (H)   06/27/2023 7.6 (H)   06/20/2023 7.8 (A)   05/15/2023 9.2 (A)   03/14/2022 8.4 (A)     Bicarbonate (mmol/L)   Date Value   09/26/2024 23   05/23/2024 25   12/06/2023 27     Urea Nitrogen (mg/dL)   Date Value   09/26/2024 28 (H)   05/23/2024 20   12/06/2023 22     Creatinine (mg/dL)   Date Value   09/26/2024 0.97   05/23/2024 0.92   12/06/2023 0.90     Lab Results   Component Value Date    CHOL 180 09/26/2024    CHOL 195 05/23/2024    CHOL 173 06/20/2023     Lab Results   Component Value Date    HDL 42.7 09/26/2024    HDL 42.2 05/23/2024    HDL 38.7 (A) 06/20/2023     Lab Results   Component Value Date    LDLCALC 94 09/26/2024    LDLCALC 90 05/23/2024     Lab Results   Component Value Date    TRIG 219 (H) 09/26/2024    TRIG 315 (H) 05/23/2024    TRIG 186 (H) 06/20/2023       Health Maintenance:   Foot Exam: August 14, 2023  Eye Exam:  November 2022  Urine Albumin:  May 23, 2024    Assessment/Plan   69-year-old male presents for follow up for type 2 diabetes mellitus. He is noted to be on an ACE inhibitor.    Type 2 diabetes mellitus without complication (Multi)  To decrease Metformin to 500mg in the morning and 1000mg with dinner    To continue Farxiga 10mg once daily   To continue Glimepiride 1mg twice daily   To obtain blood and urine tests before the next appointment  Counseled that the goal A1C should be 7% or less and thus you are at goal  Counseled glycemic control is warranted to prevent microvascular complications  To update regarding sugar values in the next 3 weeks given the reduction of Metformin       Hypertriglyceridemia  Counseled that the fasting triglyceride level should be less than 150  To continue Atorvastatin 20mg once daily   To obtain lipid panel before the next appointment    For follow up in 6 months with repeat labs

## 2024-10-25 ENCOUNTER — APPOINTMENT (OUTPATIENT)
Dept: ENDOCRINOLOGY | Facility: CLINIC | Age: 69
End: 2024-10-25
Payer: MEDICARE

## 2024-10-25 DIAGNOSIS — E11.9 TYPE 2 DIABETES MELLITUS WITHOUT COMPLICATION, UNSPECIFIED WHETHER LONG TERM INSULIN USE (MULTI): Primary | ICD-10-CM

## 2024-10-25 DIAGNOSIS — E78.1 HYPERTRIGLYCERIDEMIA: ICD-10-CM

## 2024-10-25 PROCEDURE — 3044F HG A1C LEVEL LT 7.0%: CPT | Performed by: INTERNAL MEDICINE

## 2024-10-25 PROCEDURE — 99442 PR PHYS/QHP TELEPHONE EVALUATION 11-20 MIN: CPT | Performed by: INTERNAL MEDICINE

## 2024-10-25 PROCEDURE — 1036F TOBACCO NON-USER: CPT | Performed by: INTERNAL MEDICINE

## 2024-10-25 PROCEDURE — 3048F LDL-C <100 MG/DL: CPT | Performed by: INTERNAL MEDICINE

## 2024-10-25 PROCEDURE — 3060F POS MICROALBUMINURIA REV: CPT | Performed by: INTERNAL MEDICINE

## 2024-10-25 PROCEDURE — 1159F MED LIST DOCD IN RCRD: CPT | Performed by: INTERNAL MEDICINE

## 2024-10-25 PROCEDURE — 1157F ADVNC CARE PLAN IN RCRD: CPT | Performed by: INTERNAL MEDICINE

## 2024-10-25 PROCEDURE — 1160F RVW MEDS BY RX/DR IN RCRD: CPT | Performed by: INTERNAL MEDICINE

## 2024-10-25 PROCEDURE — 4010F ACE/ARB THERAPY RXD/TAKEN: CPT | Performed by: INTERNAL MEDICINE

## 2024-10-25 RX ORDER — GLIMEPIRIDE 1 MG/1
1 TABLET ORAL 2 TIMES DAILY
Qty: 180 TABLET | Refills: 1 | Status: SHIPPED | OUTPATIENT
Start: 2024-10-25 | End: 2025-04-23

## 2024-10-25 RX ORDER — METFORMIN HYDROCHLORIDE 500 MG/1
TABLET, EXTENDED RELEASE ORAL
Qty: 270 TABLET | Refills: 1 | Status: SHIPPED | OUTPATIENT
Start: 2024-10-25

## 2024-10-25 RX ORDER — DAPAGLIFLOZIN 10 MG/1
10 TABLET, FILM COATED ORAL DAILY
Qty: 90 TABLET | Refills: 1 | Status: SHIPPED | OUTPATIENT
Start: 2024-10-25 | End: 2025-04-23

## 2024-10-25 RX ORDER — ATORVASTATIN CALCIUM 20 MG/1
20 TABLET, FILM COATED ORAL DAILY
Qty: 30 TABLET | Refills: 11 | Status: SHIPPED | OUTPATIENT
Start: 2024-10-25 | End: 2025-10-25

## 2024-10-25 ASSESSMENT — ENCOUNTER SYMPTOMS
COUGH: 1
HEADACHES: 1
WEAKNESS: 0
SLEEP DISTURBANCE: 1
ARTHRALGIAS: 1
APPETITE CHANGE: 1

## 2024-10-25 NOTE — ASSESSMENT & PLAN NOTE
Counseled that the fasting triglyceride level should be less than 150  To continue Atorvastatin 20mg once daily   To obtain lipid panel before the next appointment    For follow up in 6 months with repeat labs

## 2024-10-25 NOTE — ASSESSMENT & PLAN NOTE
To decrease Metformin to 500mg in the morning and 1000mg with dinner    To continue Farxiga 10mg once daily   To continue Glimepiride 1mg twice daily   To obtain blood and urine tests before the next appointment  Counseled that the goal A1C should be 7% or less and thus you are at goal  Counseled glycemic control is warranted to prevent microvascular complications  To update regarding sugar values in the next 3 weeks given the reduction of Metformin

## 2024-11-26 ENCOUNTER — APPOINTMENT (OUTPATIENT)
Dept: DERMATOLOGY | Facility: CLINIC | Age: 69
End: 2024-11-26
Payer: MEDICARE

## 2024-11-26 DIAGNOSIS — D48.5 NEOPLASM OF UNCERTAIN BEHAVIOR OF SKIN: Primary | ICD-10-CM

## 2024-11-26 DIAGNOSIS — D22.5 MELANOCYTIC NEVUS OF TRUNK: ICD-10-CM

## 2024-11-26 DIAGNOSIS — Z85.820 PERSONAL HISTORY OF MALIGNANT MELANOMA OF SKIN: ICD-10-CM

## 2024-11-26 DIAGNOSIS — L81.4 LENTIGO: ICD-10-CM

## 2024-11-26 DIAGNOSIS — L21.9 SEBORRHEIC DERMATITIS: ICD-10-CM

## 2024-11-26 DIAGNOSIS — L57.8 DIFFUSE PHOTODAMAGE OF SKIN: ICD-10-CM

## 2024-11-26 DIAGNOSIS — L57.0 ACTINIC KERATOSIS: ICD-10-CM

## 2024-11-26 DIAGNOSIS — L82.1 SEBORRHEIC KERATOSIS: ICD-10-CM

## 2024-11-26 PROCEDURE — 3044F HG A1C LEVEL LT 7.0%: CPT | Performed by: DERMATOLOGY

## 2024-11-26 PROCEDURE — 17004 DESTROY PREMAL LESIONS 15/>: CPT | Performed by: DERMATOLOGY

## 2024-11-26 PROCEDURE — 11302 SHAVE SKIN LESION 1.1-2.0 CM: CPT | Performed by: DERMATOLOGY

## 2024-11-26 PROCEDURE — 99204 OFFICE O/P NEW MOD 45 MIN: CPT | Performed by: DERMATOLOGY

## 2024-11-26 PROCEDURE — 1159F MED LIST DOCD IN RCRD: CPT | Performed by: DERMATOLOGY

## 2024-11-26 PROCEDURE — 4010F ACE/ARB THERAPY RXD/TAKEN: CPT | Performed by: DERMATOLOGY

## 2024-11-26 PROCEDURE — 11301 SHAVE SKIN LESION 0.6-1.0 CM: CPT | Performed by: DERMATOLOGY

## 2024-11-26 PROCEDURE — 3048F LDL-C <100 MG/DL: CPT | Performed by: DERMATOLOGY

## 2024-11-26 PROCEDURE — 1157F ADVNC CARE PLAN IN RCRD: CPT | Performed by: DERMATOLOGY

## 2024-11-26 PROCEDURE — 3060F POS MICROALBUMINURIA REV: CPT | Performed by: DERMATOLOGY

## 2024-11-26 RX ORDER — KETOCONAZOLE 20 MG/ML
SHAMPOO, SUSPENSION TOPICAL
Qty: 120 ML | Refills: 11 | Status: SHIPPED | OUTPATIENT
Start: 2024-11-26

## 2024-11-26 NOTE — PROGRESS NOTES
"Subjective     Cosme Noel is a 69 y.o. male who presents for the following: Skin Check.  He notes a new red, scaly and scabby bump on the back of his right leg, which has been present for a few months and does not heal.  He also notes a dry, flaky scalp.  He denies any other new, changing, or concerning skin lesions; no bleeding, itching, or burning lesions.      Review of Systems:  No other skin or systemic complaints other than what is documented elsewhere in the note.    The following portions of the chart were reviewed this encounter and updated as appropriate:       Skin Cancer History  No skin cancer on file.    Specialty Problems          Dermatology Problems    Actinic keratosis    Follicular disorder, unspecified    Inflamed seborrheic keratosis    Melanocytic nevi of left lower limb, including hip    Melanocytic nevi of trunk    Other seborrheic dermatitis    Other specified follicular disorders    Personal history of malignant melanoma of skin    Sebaceous cyst    Seborrheic dermatitis, unspecified    Malignant melanoma of skin of abdomen (Multi)       Past Dermatologic / Past Relevant Medical History:    - history of melanoma, superficial spreading type with Breslow depth 0.5 mm, on \"epigastric skin\" diagnosed by an outside Dermatologist on 11/19/20 s/p wide local excision by Dr. 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 Blanchard on 11/22/22 s/p re-excision by Dr. 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 [employer] and his wife works in claims for [employer]    Allergies:  Bee venom protein (honey bee)    Current Medications / CAM's:    Current Outpatient Medications:     ascorbic acid (Vitamin C) 500 mg tablet, Take by mouth., Disp: " , Rfl:     aspirin 325 mg EC tablet, Take 1 tablet (325 mg) by mouth once daily., Disp: , Rfl:     atorvastatin (Lipitor) 20 mg tablet, Take 1 tablet (20 mg) by mouth once daily., Disp: 30 tablet, Rfl: 11    baclofen (Lioresal) 10 mg tablet, Take 1 tablet (10 mg) by mouth 3 times a day., Disp: , Rfl:     blood sugar diagnostic (Blood Glucose Test) strip, once daily., Disp: , Rfl:     cholecalciferol, vitamin D3, 125 mcg/0.5 mL (5K unit/0.5mL) drops, Take by mouth., Disp: , Rfl:     cyanocobalamin, vitamin B-12, (VITAMIN B-12 ORAL), Take by mouth., Disp: , Rfl:     dapagliflozin propanediol (Farxiga) 10 mg, Take 1 tablet (10 mg) by mouth once daily., Disp: 90 tablet, Rfl: 1    flaxseed oiL 1,000 mg capsule, Take 1 Dose by mouth once daily., Disp: , Rfl:     glimepiride (AmaryL) 1 mg tablet, Take 1 tablet (1 mg) by mouth 2 times a day., Disp: 180 tablet, Rfl: 1    glucosamine-chondroitin 500-400 mg tablet, Take 1 tablet by mouth 3 times a day., Disp: , Rfl:     ketoconazole-iodoquinoL-hc 2-1-2.5 % cream, Apply a thin layer to the affect area in the morning and at night, Disp: 30 g, Rfl: 1    lisinopril 20 mg tablet, Take 1 tablet (20 mg) by mouth once daily., Disp: 90 tablet, Rfl: 1    metFORMIN  mg 24 hr tablet, Take 1 tablet with breakfast and 2 tablets with dinner, Disp: 270 tablet, Rfl: 1    multivitamin (Daily Multi-Vitamin) tablet, Take by mouth., Disp: , Rfl:     naproxen sodium (Aleve) 220 mg capsule, Take 1 tablet by mouth every 12 hours if needed., Disp: , Rfl:     omega 3-dha-epa-fish oil (Fish OiL) 1,600-500-800 mg/5 mL liquid, Take 1 Dose by mouth once daily., Disp: , Rfl:     oxyCODONE (Roxicodone) 5 mg immediate release tablet, Take 1-2 tablets (5-10 mg) by mouth every 6 hours if needed., Disp: , Rfl:     pantoprazole (ProtoNix) 20 mg EC tablet, Take 1 tablet (20 mg) by mouth once daily., Disp: , Rfl:     pantoprazole (ProtoNix) 40 mg EC tablet, Take 1 tablet (40 mg) by mouth once daily., Disp: ,  Rfl:     saccharomyces boulardii (Florastor) 250 mg capsule, Take 1 capsule (250 mg) by mouth 2 times a day., Disp: , Rfl:     ketoconazole (NIZOral) 2 % shampoo, Wash affected areas of scalp 2-3 times weekly as directed, Disp: 120 mL, Rfl: 11     Objective   Well appearing patient in no apparent distress; mood and affect are within normal limits.    A full examination was performed including scalp, face, eyes, ears, nose, lips, neck, chest, axillae, abdomen, back, bilateral upper extremities, and bilateral lower extremities. All findings within normal limits unless otherwise noted below.    Assessment/Plan   1. Neoplasm of uncertain behavior of skin (3)  Right Posterior Distal Leg  1.1 cm erythematous, scaly plaque          Shave removal    Lesion length (cm):  1.1  Margin per side (cm):  0  Lesion diameter (cm):  1.1  Informed consent: discussed and consent obtained    Timeout: patient name, date of birth, surgical site, and procedure verified    Procedure prep:  Patient was prepped and draped  Anesthesia: the lesion was anesthetized in a standard fashion    Anesthetic:  1% lidocaine w/ epinephrine 1-100,000 local infiltration  Instrument used: flexible razor blade    Hemostasis achieved with: aluminum chloride    Outcome: patient tolerated procedure well    Post-procedure details: sterile dressing applied and wound care instructions given    Dressing type: bandage and petrolatum      Staff Communication: Dermatology Local Anesthesia: 1 % Lidocaine / Epinephrine - Amount:0.5ml    Specimen 1 - Dermatopathology- DERM LAB  Differential Diagnosis: ISK v SCC v PoroK v BCC  Check Margins Yes/No?:    Comments:    Dermpath Lab: Routine Histopathology (formalin-fixed tissue)    Right Anterior Proximal Arm  6 mm dark brown pigmented, asymmetric macule with an asymmetric pigment network and irregular borders           Shave removal    Lesion length (cm):  0.6  Margin per side (cm):  0.2  Lesion diameter (cm):  1  Informed  consent: discussed and consent obtained    Timeout: patient name, date of birth, surgical site, and procedure verified    Procedure prep:  Patient was prepped and draped  Anesthesia: the lesion was anesthetized in a standard fashion    Anesthetic:  1% lidocaine w/ epinephrine 1-100,000 local infiltration  Instrument used: flexible razor blade    Hemostasis achieved with: aluminum chloride    Outcome: patient tolerated procedure well    Post-procedure details: sterile dressing applied and wound care instructions given    Dressing type: bandage and petrolatum      Staff Communication: Dermatology Local Anesthesia: 1 % Lidocaine / Epinephrine - Amount:0.5ml    Specimen 2 - Dermatopathology- DERM LAB  Differential Diagnosis: DN  Check Margins Yes/No?:    Comments:    Dermpath Lab: Routine Histopathology (formalin-fixed tissue)    Right Anterior Distal Thigh  5 mm dark brown pigmented, asymmetric macule with an asymmetric pigment network and irregular borders           Shave removal    Lesion length (cm):  0.5  Margin per side (cm):  0.2  Lesion diameter (cm):  0.9  Informed consent: discussed and consent obtained    Timeout: patient name, date of birth, surgical site, and procedure verified    Procedure prep:  Patient was prepped and draped  Anesthesia: the lesion was anesthetized in a standard fashion    Anesthetic:  1% lidocaine w/ epinephrine 1-100,000 local infiltration  Instrument used: flexible razor blade    Hemostasis achieved with: aluminum chloride    Outcome: patient tolerated procedure well    Post-procedure details: sterile dressing applied and wound care instructions given    Dressing type: bandage and petrolatum      Staff Communication: Dermatology Local Anesthesia: 1 % Lidocaine / Epinephrine - Amount:0.5ml    Specimen 3 - Dermatopathology- DERM LAB  Differential Diagnosis: DN  Check Margins Yes/No?:    Comments:    Dermpath Lab: Routine Histopathology (formalin-fixed tissue)    2. Actinic keratosis  (17)  Head - Anterior (Face) (17)  Scattered on the patient's face, there are multiple erythematous, gritty, scaly macules     Actinic Keratoses -scattered on face.  The pre-cancerous nature of these lesions and treatment options were discussed with the patient today.  At this time, I recommend treatment with liquid nitrogen cryotherapy.  The patient expressed understanding, is in agreement with this plan, and wishes to proceed with cryotherapy today.    Destr of lesion - Head - Anterior (Face) (17)  Complexity: simple    Destruction method: cryotherapy    Informed consent: discussed and consent obtained    Lesion destroyed using liquid nitrogen: Yes    Cryotherapy cycles:  1  Outcome: patient tolerated procedure well with no complications    Post-procedure details: wound care instructions given      3. Melanocytic nevus of trunk  Scattered on the patient's face, neck, trunk, and extremities, there are multiple small, round- to oval-shaped, brown-pigmented and pink-colored, symmetric, uniform-appearing macules and dome-shaped papules    Clinically benign- to slightly atypical-appearing nevi - the clinically benign- to slightly atypical-appearing nature of the remainder of the patient's nevi was discussed with the patient today.  None of the patient's nevi, with the exception of the 2 noted above, meet threshold for biopsy today.  I emphasized the importance of performing monthly self-skin exams using the ABCDs of monitoring moles, which were reviewed with the patient today and an informational hand-out provided.  I also emphasized the importance of sun avoidance and sun protection with daily sunscreen use.    4. Seborrheic keratosis  Scattered on the patient's face, neck, trunk, and extremities, there are multiple tan- to light brown-colored, hyperkeratotic, stuck-on appearing papules of varying size and shape    Seborrheic Keratoses - the benign nature of these lesions was discussed with the patient today and  reassurance provided.  No treatment is medically indicated for these lesions at this time.    5. Lentigo  Photodistributed  Multiple tan- to light brown-colored, round- to oval-shaped, symmetric and uniform-appearing macules and small patches consistent with lentigines scattered in sun-exposed areas.    Solar Lentigines and photodamage.  The clinically benign-appearing nature of these lesions and their relation to chronic sun exposure were discussed with the patient today and reassurance provided.  None of these lesions meet threshold for biopsy today, and thus no treatment is medically indicated for these lesions at this time.  The signs and symptoms of skin cancer were reviewed and the patient was advised to practice sun protection and sun avoidance, use daily sunscreen, and perform regular self skin exams.  The patient was instructed to monitor these lesions for any changes, such as in size, shape, or color, or associated symptoms and to call our office to schedule a return visit for re-evaluation if any such changes or symptoms are noticed in the future.  The patient expressed understanding and is in agreement with this plan.    6. Seborrheic dermatitis  Scalp  On the patient's scalp, there are pink, scaly patches with whitish-yellowish, greasy scale    Seborrheic Dermatitis - scalp.  The potentially chronic and intermittently flaring nature of this condition and treatment options were discussed extensively with the patient today.  At this time, I recommend topical anti-fungal therapy with Ketoconazole 2% shampoo, which the patient was instructed to use 2-3 days per week, alternating with over-the-counter anti-dandruff shampoos, such as Head & Shoulders, Selsun Blue, and Neutrogena T-gel, every month.  The risks, benefits, and side effects of this medication were discussed.  The patient expressed understanding and is in agreement with this plan.    ketoconazole (NIZOral) 2 % shampoo - Scalp  Wash affected areas  of scalp 2-3 times weekly as directed    Related Medications  ketoconazole-iodoquinoL-hc 2-1-2.5 % cream  Apply a thin layer to the affect area in the morning and at night    7. Personal history of malignant melanoma of skin  The sites of prior melanoma excision were examined.  There is no evidence of recurrent growth or pigmentation or recurrent disease, satellite papules, or nodules on exam today.    History of melanoma, atypical junctional melanocytic neoplasm, and actinic keratoses and photodamage.  There is no evidence of local, regional, or distant recurrent disease or any new primary melanomas on exam today.  I emphasized the importance of continuing to perform monthly self-skin and lymph node exams using the ABCDs of monitoring moles, which were reviewed with the patient, as well as the importance of sun avoidance and sun protection with daily sunscreen use.  I will have the patient return to our office in 4 to 6 months, pending the above biopsy results, for routine melanoma follow-up and total body skin exam, and the patient was instructed to call our office should the patient notice any new, changing, symptomatic, or otherwise concerning skin lesions before then.  The patient expressed understanding and is in agreement with this plan.    8. Diffuse photodamage of skin  Photodistributed  Diffuse photodamage with actinic changes with telangiectasia and mottled pigmentation in sun-exposed areas.    Photodamage.  The signs and symptoms of skin cancer were reviewed and the patient was advised to practice sun protection and sun avoidance, use daily sunscreen, and perform regular self skin exams.  Sun protection was discussed, including avoiding the mid-day sun, wearing a sunscreen with SPF at least 50, and stressing the need for reapplication of sunscreen and applying more than they think they need.

## 2024-12-03 LAB
LAB AP ASR DISCLAIMER: NORMAL
LABORATORY COMMENT REPORT: NORMAL
PATH REPORT.FINAL DX SPEC: NORMAL
PATH REPORT.GROSS SPEC: NORMAL
PATH REPORT.MICROSCOPIC SPEC OTHER STN: NORMAL
PATH REPORT.RELEVANT HX SPEC: NORMAL
PATH REPORT.TOTAL CANCER: NORMAL

## 2024-12-12 ENCOUNTER — TELEPHONE (OUTPATIENT)
Dept: DERMATOLOGY | Facility: CLINIC | Age: 69
End: 2024-12-12
Payer: MEDICARE

## 2025-02-07 ENCOUNTER — TELEPHONE (OUTPATIENT)
Dept: ENDOCRINOLOGY | Facility: CLINIC | Age: 70
End: 2025-02-07
Payer: MEDICARE

## 2025-02-07 DIAGNOSIS — E11.9 TYPE 2 DIABETES MELLITUS WITHOUT COMPLICATION, UNSPECIFIED WHETHER LONG TERM INSULIN USE (MULTI): ICD-10-CM

## 2025-02-07 NOTE — TELEPHONE ENCOUNTER
(Next appt 4/29/2025)    Patient insurance no longer covering the dadapglifllozin, please see attached

## 2025-02-24 DIAGNOSIS — E11.9 TYPE 2 DIABETES MELLITUS WITHOUT COMPLICATION, UNSPECIFIED WHETHER LONG TERM INSULIN USE (MULTI): ICD-10-CM

## 2025-02-24 RX ORDER — LISINOPRIL 20 MG/1
20 TABLET ORAL DAILY
Qty: 90 TABLET | Refills: 1 | Status: SHIPPED | OUTPATIENT
Start: 2025-02-24 | End: 2025-08-23

## 2025-02-24 NOTE — TELEPHONE ENCOUNTER
Mr. Noel left a message on [facility] about getting Lisinopril 10 mg refilled at [facility] in [place]. He is out of medication.   Next appointment 4/29/25.

## 2025-02-28 DIAGNOSIS — E11.9 TYPE 2 DIABETES MELLITUS WITHOUT COMPLICATION, UNSPECIFIED WHETHER LONG TERM INSULIN USE (MULTI): Primary | ICD-10-CM

## 2025-02-28 RX ORDER — CALCIUM CITRATE/VITAMIN D3 200MG-6.25
TABLET ORAL
COMMUNITY
End: 2025-02-28 | Stop reason: SDUPTHER

## 2025-02-28 RX ORDER — CALCIUM CITRATE/VITAMIN D3 200MG-6.25
1 TABLET ORAL 2 TIMES DAILY
Qty: 200 STRIP | Refills: 3 | Status: SHIPPED | OUTPATIENT
Start: 2025-02-28 | End: 2026-02-28

## 2025-02-28 NOTE — TELEPHONE ENCOUNTER
(Next appt 4/29/2025)    Patient need true metrix test strips sent to [facility] (testing 2 times a day)

## 2025-03-13 DIAGNOSIS — E11.9 TYPE 2 DIABETES MELLITUS WITHOUT COMPLICATION, UNSPECIFIED WHETHER LONG TERM INSULIN USE (MULTI): Primary | ICD-10-CM

## 2025-03-13 RX ORDER — INSULIN PUMP SYRINGE, 3 ML
1 EACH MISCELLANEOUS 2 TIMES DAILY
Qty: 1 EACH | Refills: 0 | Status: SHIPPED | OUTPATIENT
Start: 2025-03-13 | End: 2026-03-13

## 2025-03-13 RX ORDER — IBUPROFEN 200 MG
1 CAPSULE ORAL 2 TIMES DAILY
Qty: 200 STRIP | Refills: 3 | Status: SHIPPED | OUTPATIENT
Start: 2025-03-13 | End: 2026-03-13

## 2025-03-13 RX ORDER — LANCETS 33 GAUGE
EACH MISCELLANEOUS
COMMUNITY
End: 2025-03-13 | Stop reason: SDUPTHER

## 2025-03-13 RX ORDER — LANCETS 33 GAUGE
1 EACH MISCELLANEOUS 2 TIMES DAILY
Qty: 200 EACH | Refills: 3 | Status: SHIPPED | OUTPATIENT
Start: 2025-03-13 | End: 2026-03-13

## 2025-03-13 RX ORDER — INSULIN PUMP SYRINGE, 3 ML
1 EACH MISCELLANEOUS AS NEEDED
COMMUNITY
End: 2025-03-13 | Stop reason: SDUPTHER

## 2025-03-13 NOTE — TELEPHONE ENCOUNTER
(Next appt 4/29/2025)    Patient will need onetouch verio,strips and lancets sent to [facility]

## 2025-04-10 ENCOUNTER — TELEPHONE (OUTPATIENT)
Dept: ENDOCRINOLOGY | Facility: CLINIC | Age: 70
End: 2025-04-10
Payer: MEDICARE

## 2025-04-10 NOTE — TELEPHONE ENCOUNTER
Mr. Noel called in and wanted Dr. Truong to know that he is having an upcoming surgery and he got labs done at [facility]. [facility] stated that his results will be in the system and once they are can Dr. Truong review them.     Next appointment 4/29/25

## 2025-04-22 ENCOUNTER — TELEPHONE (OUTPATIENT)
Dept: ENDOCRINOLOGY | Facility: CLINIC | Age: 70
End: 2025-04-22
Payer: MEDICARE

## 2025-04-22 NOTE — TELEPHONE ENCOUNTER
[facility] called and stated that they never received the lab order for the Albumin-creatinine Ratio, Urine. Asked if the order could be put in again.

## 2025-04-22 NOTE — TELEPHONE ENCOUNTER
Mr. Noel called in to let the office know that he has taken his albumin-Creatinine Ratio Urine test.

## 2025-04-28 NOTE — PATIENT INSTRUCTIONS
Thank you for choosing [facility]  for your health care needs.  If you have any questions, concerns or medical needs, please feel free to contact our office at [phone].    Please ensure you complete your blood work one week before the next scheduled appointment.    To continue Metformin 500mg in the morning and 1000mg with dinner; hold the morning of surgery   To continue Farxiga 10mg once daily; hold three days before surgery   To continue Glimepiride 1mg twice daily before breakfast and dinner; hold the morning of surgery   To obtain blood and urine tests before the next appointment  Counseled that the goal A1C should be 7% or less and thus you are at goal  Counseled glycemic control is warranted to prevent microvascular complications  For follow up in 6 months with repeat labs

## 2025-04-28 NOTE — PROGRESS NOTES
"Subjective   Cosme Noel is a 70 y.o. male who presents for follow-up of Type 2 diabetes mellitus. The initial diagnosis of diabetes was made in 2017 after undergoing chemotherapy.      He is for shoulder replacement next week on 5/8    He is getting diffucltiy using the OneTouch Verio; True Metrix was easier.  His wife was called and she states that she has figured out how to use it.      Known complications due to diabetes included peripheral neuropathy and obesity    Cardiovascular risk factors include advanced age (older than 55 for men, 65 for women), diabetes mellitus, male gender, microalbuminuria, and obesity (BMI >= 30 kg/m2). The patient is on an ACE inhibitor or angiotensin II receptor blocker.  The patient has not been previously hospitalized due to diabetic ketoacidosis.     Current symptoms/problems include none. His clinical course has been stable.     The patient is currently checking the blood glucose but not on a consistent basis.  Highest 127mg/dL     Hypoglycemic episodes: denies; lowest value was 87mg/dL   Hypoglycemic symptoms: N/A     Previous medications tried:  Trulicity for three months      Current Medication Regimen  Metformin 500-1000mg twice daily   Farxiga 10mg once daily   Glimepiride 1mg twice daily      MEALS:   Breakfast - banana, toast; can omit   Lunch - can omit   Dinner - eggplant with pasta   Snacks - cheese  Beverages - water, sun tea (unsweetened)     Exercise regimen - walking, riding the bike, stretching, weight lifting     Review of Systems   Constitutional:  Positive for appetite change.   Neurological:  Positive for numbness (Worsened to hands and feet).   Psychiatric/Behavioral:  Negative for sleep disturbance.      Objective   /76   Pulse 71   Ht 1.765 m (5' 9.5\")   Wt 95.9 kg (211 lb 6.4 oz)   BMI 30.77 kg/m²   Physical Exam  Vitals and nursing note reviewed.   Constitutional:       General: He is not in acute distress.     Appearance: Normal " appearance. He is obese.   HENT:      Head: Normocephalic and atraumatic.      Nose: Nose normal.      Mouth/Throat:      Mouth: Mucous membranes are moist.   Eyes:      Extraocular Movements: Extraocular movements intact.   Cardiovascular:      Rate and Rhythm: Normal rate and regular rhythm.   Pulmonary:      Effort: Pulmonary effort is normal.      Breath sounds: Normal breath sounds.   Musculoskeletal:         General: Normal range of motion.      Comments: Limited ROM to right shoulder    Skin:     General: Skin is warm.   Neurological:      Mental Status: He is alert and oriented to person, place, and time.   Psychiatric:         Mood and Affect: Mood normal.         Lab Review  Glucose (mg/dL)   Date Value   09/26/2024 109 (H)   05/23/2024 97   12/06/2023 156 (H)     POC HEMOGLOBIN A1c (%)   Date Value   12/05/2023 7.6 (A)     Hemoglobin A1C (%)   Date Value   09/26/2024 5.9 (H)   05/23/2024 6.2 (H)   06/27/2023 7.6 (H)   06/20/2023 7.8 (A)   05/15/2023 9.2 (A)   03/14/2022 8.4 (A)     Bicarbonate (mmol/L)   Date Value   09/26/2024 23   05/23/2024 25   12/06/2023 27     Urea Nitrogen (mg/dL)   Date Value   09/26/2024 28 (H)   05/23/2024 20   12/06/2023 22     Creatinine (mg/dL)   Date Value   09/26/2024 0.97   05/23/2024 0.92   12/06/2023 0.90     Lab Results   Component Value Date    CHOL 180 09/26/2024    CHOL 195 05/23/2024    CHOL 173 06/20/2023     Lab Results   Component Value Date    HDL 42.7 09/26/2024    HDL 42.2 05/23/2024    HDL 38.7 (A) 06/20/2023     Lab Results   Component Value Date    LDLCALC 94 09/26/2024    LDLCALC 90 05/23/2024     Lab Results   Component Value Date    TRIG 219 (H) 09/26/2024    TRIG 315 (H) 05/23/2024    TRIG 186 (H) 06/20/2023               Health Maintenance:   Foot Exam: August 14, 2023  Eye Exam:  November 2022  Urine Albumin:  May 23, 2024    Assessment/Plan   70-year-old male presents for follow up for type 2 diabetes mellitus. His blood pressure is at goal  today.    Type 2 diabetes mellitus without complication (Multi)  To continue Metformin 500mg in the morning and 1000mg with dinner; hold the morning of surgery   To continue Farxiga 10mg once daily; hold three days before surgery   To continue Glimepiride 1mg twice daily before breakfast and dinner; hold the morning of surgery   To obtain blood and urine tests before the next appointment  Counseled that the goal A1C should be 7% or less and thus you are at goal  Counseled glycemic control is warranted to prevent microvascular complications      Hypertriglyceridemia  To obtain lipid panel before the next appointment    For follow up in 6 months with repeat labs

## 2025-04-29 ENCOUNTER — APPOINTMENT (OUTPATIENT)
Dept: ENDOCRINOLOGY | Facility: CLINIC | Age: 70
End: 2025-04-29
Payer: MEDICARE

## 2025-04-29 VITALS
DIASTOLIC BLOOD PRESSURE: 76 MMHG | SYSTOLIC BLOOD PRESSURE: 130 MMHG | HEIGHT: 70 IN | HEART RATE: 71 BPM | WEIGHT: 211.4 LBS | BODY MASS INDEX: 30.26 KG/M2

## 2025-04-29 DIAGNOSIS — E78.1 HYPERTRIGLYCERIDEMIA: ICD-10-CM

## 2025-04-29 DIAGNOSIS — E11.9 TYPE 2 DIABETES MELLITUS WITHOUT COMPLICATION, UNSPECIFIED WHETHER LONG TERM INSULIN USE: ICD-10-CM

## 2025-04-29 LAB — POC FINGERSTICK BLOOD GLUCOSE: 145 MG/DL (ref 70–100)

## 2025-04-29 PROCEDURE — 3078F DIAST BP <80 MM HG: CPT | Performed by: INTERNAL MEDICINE

## 2025-04-29 PROCEDURE — 1160F RVW MEDS BY RX/DR IN RCRD: CPT | Performed by: INTERNAL MEDICINE

## 2025-04-29 PROCEDURE — 4010F ACE/ARB THERAPY RXD/TAKEN: CPT | Performed by: INTERNAL MEDICINE

## 2025-04-29 PROCEDURE — 3008F BODY MASS INDEX DOCD: CPT | Performed by: INTERNAL MEDICINE

## 2025-04-29 PROCEDURE — 1159F MED LIST DOCD IN RCRD: CPT | Performed by: INTERNAL MEDICINE

## 2025-04-29 PROCEDURE — 3075F SYST BP GE 130 - 139MM HG: CPT | Performed by: INTERNAL MEDICINE

## 2025-04-29 PROCEDURE — 99214 OFFICE O/P EST MOD 30 MIN: CPT | Performed by: INTERNAL MEDICINE

## 2025-04-29 PROCEDURE — 82962 GLUCOSE BLOOD TEST: CPT | Performed by: INTERNAL MEDICINE

## 2025-04-29 PROCEDURE — 1157F ADVNC CARE PLAN IN RCRD: CPT | Performed by: INTERNAL MEDICINE

## 2025-04-29 PROCEDURE — G2211 COMPLEX E/M VISIT ADD ON: HCPCS | Performed by: INTERNAL MEDICINE

## 2025-04-29 ASSESSMENT — ENCOUNTER SYMPTOMS
NUMBNESS: 1
SLEEP DISTURBANCE: 0
APPETITE CHANGE: 1

## 2025-05-04 RX ORDER — METFORMIN HYDROCHLORIDE 500 MG/1
TABLET, EXTENDED RELEASE ORAL
Qty: 270 TABLET | Refills: 1 | Status: SHIPPED | OUTPATIENT
Start: 2025-05-04

## 2025-05-04 RX ORDER — GLIMEPIRIDE 1 MG/1
1 TABLET ORAL 2 TIMES DAILY
Qty: 180 TABLET | Refills: 1 | Status: SHIPPED | OUTPATIENT
Start: 2025-05-04 | End: 2025-10-31

## 2025-05-04 RX ORDER — DAPAGLIFLOZIN 10 MG/1
10 TABLET, FILM COATED ORAL DAILY
Qty: 90 TABLET | Refills: 1 | Status: SHIPPED | OUTPATIENT
Start: 2025-05-04 | End: 2025-10-31

## 2025-05-04 NOTE — ASSESSMENT & PLAN NOTE
To continue Metformin 500mg in the morning and 1000mg with dinner; hold the morning of surgery   To continue Farxiga 10mg once daily; hold three days before surgery   To continue Glimepiride 1mg twice daily before breakfast and dinner; hold the morning of surgery   To obtain blood and urine tests before the next appointment  Counseled that the goal A1C should be 7% or less and thus you are at goal  Counseled glycemic control is warranted to prevent microvascular complications

## 2025-05-19 DIAGNOSIS — E11.9 TYPE 2 DIABETES MELLITUS WITHOUT COMPLICATION, UNSPECIFIED WHETHER LONG TERM INSULIN USE: ICD-10-CM

## 2025-05-19 RX ORDER — GLIMEPIRIDE 1 MG/1
1 TABLET ORAL 2 TIMES DAILY
Qty: 180 TABLET | Refills: 1 | Status: SHIPPED | OUTPATIENT
Start: 2025-05-19 | End: 2025-11-15

## 2025-05-19 RX ORDER — DAPAGLIFLOZIN 10 MG/1
10 TABLET, FILM COATED ORAL DAILY
Qty: 90 TABLET | Refills: 1 | Status: SHIPPED | OUTPATIENT
Start: 2025-05-19 | End: 2025-11-15

## 2025-05-19 RX ORDER — LISINOPRIL 20 MG/1
20 TABLET ORAL DAILY
Qty: 90 TABLET | Refills: 1 | Status: SHIPPED | OUTPATIENT
Start: 2025-05-19 | End: 2025-11-15

## 2025-05-19 RX ORDER — METFORMIN HYDROCHLORIDE 500 MG/1
TABLET, EXTENDED RELEASE ORAL
Qty: 270 TABLET | Refills: 1 | Status: SHIPPED | OUTPATIENT
Start: 2025-05-19

## 2025-05-19 NOTE — TELEPHONE ENCOUNTER
Mr. Noel is switching over to [facility] and will need a refill on Metformin,   Lisinopril, glimepiride, and Farxiga.    Next appointment 11/04/25

## 2025-05-28 ENCOUNTER — APPOINTMENT (OUTPATIENT)
Dept: DERMATOLOGY | Facility: CLINIC | Age: 70
End: 2025-05-28
Payer: MEDICARE

## 2025-05-28 DIAGNOSIS — D22.5 MELANOCYTIC NEVUS OF TRUNK: ICD-10-CM

## 2025-05-28 DIAGNOSIS — D49.2 NEOPLASM OF SKIN: ICD-10-CM

## 2025-05-28 DIAGNOSIS — L57.0 ACTINIC KERATOSIS: ICD-10-CM

## 2025-05-28 DIAGNOSIS — L73.9 FOLLICULITIS: ICD-10-CM

## 2025-05-28 DIAGNOSIS — L57.8 DIFFUSE PHOTODAMAGE OF SKIN: ICD-10-CM

## 2025-05-28 DIAGNOSIS — Z85.820 PERSONAL HISTORY OF MALIGNANT MELANOMA OF SKIN: ICD-10-CM

## 2025-05-28 DIAGNOSIS — L82.1 SEBORRHEIC KERATOSIS: ICD-10-CM

## 2025-05-28 DIAGNOSIS — Z12.83 ENCOUNTER FOR SCREENING FOR MALIGNANT NEOPLASM OF SKIN: ICD-10-CM

## 2025-05-28 DIAGNOSIS — D48.5 NEOPLASM OF UNCERTAIN BEHAVIOR OF SKIN: Primary | ICD-10-CM

## 2025-05-28 DIAGNOSIS — L81.4 LENTIGO: ICD-10-CM

## 2025-05-28 RX ORDER — CLINDAMYCIN PHOSPHATE 10 UG/ML
1 LOTION TOPICAL 2 TIMES DAILY
Qty: 60 ML | Refills: 11 | Status: SHIPPED | OUTPATIENT
Start: 2025-05-28

## 2025-05-28 ASSESSMENT — DERMATOLOGY QUALITY OF LIFE (QOL) ASSESSMENT
DATE THE QUALITY-OF-LIFE ASSESSMENT WAS COMPLETED: 67353
WHAT SINGLE SKIN CONDITION LISTED BELOW IS THE PATIENT ANSWERING THE QUALITY-OF-LIFE ASSESSMENT QUESTIONS ABOUT: NONE OF THE ABOVE
RATE HOW EMOTIONALLY BOTHERED YOU ARE BY YOUR SKIN PROBLEM (FOR EXAMPLE, WORRY, EMBARRASSMENT, FRUSTRATION): 0 - NEVER BOTHERED
RATE HOW BOTHERED YOU ARE BY SYMPTOMS OF YOUR SKIN PROBLEM (EG, ITCHING, STINGING BURNING, HURTING OR SKIN IRRITATION): 0 - NEVER BOTHERED
RATE HOW BOTHERED YOU ARE BY EFFECTS OF YOUR SKIN PROBLEMS ON YOUR ACTIVITIES (EG, GOING OUT, ACCOMPLISHING WHAT YOU WANT, WORK ACTIVITIES OR YOUR RELATIONSHIPS WITH OTHERS): 0 - NEVER BOTHERED
ARE THERE EXCLUSIONS OR EXCEPTIONS FOR THE QUALITY OF LIFE ASSESSMENT: NO

## 2025-05-28 ASSESSMENT — DERMATOLOGY PATIENT ASSESSMENT
DO YOU HAVE ANY NEW OR CHANGING LESIONS: NO
DO YOU USE SUNSCREEN: OCCASIONALLY
DO YOU USE A TANNING BED: NO

## 2025-05-28 ASSESSMENT — ITCH NUMERIC RATING SCALE: HOW SEVERE IS YOUR ITCHING?: 0

## 2025-05-28 ASSESSMENT — PATIENT GLOBAL ASSESSMENT (PGA): PATIENT GLOBAL ASSESSMENT: PATIENT GLOBAL ASSESSMENT:  1 - CLEAR

## 2025-05-28 NOTE — Clinical Note
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  The atypical nature of this dysplastic nevus, the recommendation the patient undergo complete re-excision of this lesion as noted above, and management options were discussed extensively with the patient in the office today.  At this time, we again recommend referral to our Dermatologic surgery unit for complete re-excision of this lesion as discussed previously.  He expressed understanding, is in agreement with this plan, and states he already scheduled his surgery to be performed in our office by Dr. Motley on 6/9/25.

## 2025-05-28 NOTE — Clinical Note
Seborrheic Dermatitis - scalp.  The potentially chronic and intermittently flaring nature of this condition and treatment options were discussed extensively with the patient today.  At this time, we recommend topical anti-fungal therapy with Ketoconazole 2% shampoo, which the patient was instructed to use 2-3 days per week, alternating with over-the-counter anti-dandruff shampoos, such as Head & Shoulders, Selsun Blue, and Neutrogena T-gel, every month.  The risks, benefits, and side effects of this medication were discussed.  The patient expressed understanding and is in agreement with this plan.

## 2025-05-28 NOTE — Clinical Note
Folliculitis -flare on neck, chest, and back.  The bacterial nature of this condition and treatment options were discussed with the patient today.  At this time, we recommend topical antibiotic therapy with Clindamycin 1% lotion, which the patient was instructed to apply twice daily to the affected areas or up to 3-4 times per day as needed for active lesions.  The risks, benefits, and side effects of this medication were discussed.  The patient expressed understanding and is in agreement with this plan.

## 2025-05-28 NOTE — PROGRESS NOTES
"Subjective     Cosme Noel is a 70 y.o. male who presents for the following: Skin Exam.  He notes a scaly bump on his right lower forehead, which has been present for a few months and has increased in size.  He also recent pimple breakouts on his neck and chest.  He denies any other new, changing, or concerning skin lesions since his last visit; no bleeding, itching, or burning lesions.      Review of Systems:  No other skin or systemic complaints other than what is documented elsewhere in the note.    The following portions of the chart were reviewed this encounter and updated as appropriate:       Skin Cancer History  Biopsy Log Book  No skin cancers from Specimen Tracking.    Additional History      Specialty Problems          Dermatology Problems    Actinic keratosis    Follicular disorder, unspecified    Inflamed seborrheic keratosis    Melanocytic nevi of left lower limb, including hip    Melanocytic nevi of trunk    Other seborrheic dermatitis    Other specified follicular disorders    Personal history of malignant melanoma of skin    Sebaceous cyst    Seborrheic dermatitis, unspecified    Malignant melanoma of skin of abdomen (Multi)       Past Dermatologic / Past Relevant Medical History:    - history of melanoma, superficial spreading type with Breslow depth 0.5 mm, on \"epigastric skin\" diagnosed by an outside Dermatologist on 11/19/20 s/p wide local excision by Dr. 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 Blanchard on 11/22/22 s/p re-excision by Dr. 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    A full examination was performed including scalp, face, eyes, ears, nose, lips, neck, chest, axillae, abdomen, back, bilateral upper extremities, and bilateral lower extremities. All findings within normal limits unless otherwise noted below.    Assessment/Plan   Skin Exam  1. NEOPLASM OF UNCERTAIN BEHAVIOR OF SKIN (2)  right poserior proximal shoulder  1.2 cm pink, shiny, scaly plaque    Shave removal    Lesion length (cm):  1.2  Lesion width (cm):  1.2  Margin per side (cm):  0  Lesion diameter (cm):  1.2  Informed consent: discussed and consent obtained    Timeout: patient name, date of birth, surgical site, and procedure verified    Procedure prep:  Patient was prepped and draped  Anesthesia: the lesion was anesthetized in a standard fashion    Anesthetic:  1% lidocaine w/ epinephrine 1-100,000 local infiltration  Instrument used: flexible razor blade    Hemostasis achieved with: aluminum chloride    Outcome: patient tolerated procedure well    Post-procedure details: sterile dressing applied and wound care instructions given    Dressing type: bandage and petrolatum    Specimen 2 - Dermatopathology- DERM LAB  Differential Diagnosis: HAK v sccis  Check Margins Yes/No?:    Comments:    Dermpath Lab: Routine Histopathology (formalin-fixed tissue)  right lower forehead  6 mm erythematous, scaly, tender papule    Shave removal    Lesion length (cm):  0.6  Lesion width (cm):  0.6  Margin per side (cm):  0  Lesion diameter (cm):  0.6  Informed consent: discussed and consent obtained    Timeout: patient name, date of birth, surgical site, and procedure verified  "   Procedure prep:  Patient was prepped and draped  Anesthesia: the lesion was anesthetized in a standard fashion    Anesthetic:  1% lidocaine w/ epinephrine 1-100,000 local infiltration  Instrument used: flexible razor blade    Hemostasis achieved with: aluminum chloride    Outcome: patient tolerated procedure well    Post-procedure details: sterile dressing applied and wound care instructions given    Dressing type: bandage and petrolatum    Specimen 1 - Dermatopathology- DERM LAB  Differential Diagnosis: hak v sccis  Check Margins Yes/No?:    Comments:    Dermpath Lab: Routine Histopathology (formalin-fixed tissue)  2. ACTINIC KERATOSIS (16)  Head - Anterior (Face) (16)  Scattered on the patient's face, there are multiple erythematous, gritty, scaly macules   Actinic Keratoses - scattered on face.  The pre-cancerous nature of these lesions and treatment options were discussed with the patient today.  At this time, we recommend treatment with liquid nitrogen cryotherapy.  The patient expressed understanding, is in agreement with this plan, and wishes to proceed with cryotherapy today.  Destr of lesion - Head - Anterior (Face) (16)  Complexity: simple    Destruction method: cryotherapy    Informed consent: discussed and consent obtained    Lesion destroyed using liquid nitrogen: Yes    Cryotherapy cycles:  1  Outcome: patient tolerated procedure well with no complications    Post-procedure details: wound care instructions given    3. FOLLICULITIS  Right Breast  Scattered on the patient's neck, chest, and back, there are several follicular-based erythematous, inflammatory papules and pustules  Folliculitis -flare on neck, chest, and back.  The bacterial nature of this condition and treatment options were discussed with the patient today.  At this time, we recommend topical antibiotic therapy with Clindamycin 1% lotion, which the patient was instructed to apply twice daily to the affected areas or up to 3-4 times per  day as needed for active lesions.  The risks, benefits, and side effects of this medication were discussed.  The patient expressed understanding and is in agreement with this plan.  clindamycin (Cleocin T) 1 % lotion - Right Breast  Apply 1 Application topically 2 times a day. To chest and back  4. MELANOCYTIC NEVUS OF TRUNK  Generalized  Scattered on the patient's face, neck, trunk, and extremities, there are multiple small, round- to oval-shaped, brown-pigmented and pink-colored, symmetric, uniform-appearing macules and dome-shaped papules  Clinically benign- to slightly atypical-appearing nevi - the clinically benign- to slightly atypical-appearing nature of the patient's nevi was discussed with the patient today.  None of the patient's nevi meet threshold for biopsy today.  We emphasized the importance of performing monthly self-skin exams using the ABCDs of monitoring moles, which were reviewed with the patient today, as well as the importance of sun avoidance and sun protection with daily sunscreen use.  5. LENTIGO  Photodistributed  Multiple tan- to light brown-colored, round- to oval-shaped, symmetric and uniform-appearing macules and small patches consistent with lentigines scattered in sun-exposed areas.  Solar Lentigines and photodamage.  The clinically benign-appearing nature of these lesions and their relation to chronic sun exposure were discussed with the patient today and reassurance provided.  None of these lesions meet threshold for biopsy today, and thus no treatment is medically indicated for these lesions at this time.  The signs and symptoms of skin cancer were reviewed and the patient was advised to practice sun protection and sun avoidance, use daily sunscreen, and perform regular self skin exams.  The patient was instructed to monitor these lesions for any changes, such as in size, shape, or color, or associated symptoms and to call our office to schedule a return visit for re-evaluation if  any such changes or symptoms are noticed in the future.  The patient expressed understanding and is in agreement with this plan.  6. SEBORRHEIC KERATOSIS  Generalized  Scattered on the patient's face, neck, trunk, and extremities, there are multiple tan- to light brown-colored, hyperkeratotic, stuck-on appearing papules of varying size and shape  Seborrheic Keratoses - the benign nature of these lesions was discussed with the patient today and reassurance provided.  No treatment is medically indicated for these lesions at this time.  7. NEOPLASM OF SKIN  Right anterior proximal arm  Pink, well-healed scar at his recent biopsy site  Biopsy-proven lentiginous compound lentiginous compound melanocytic neoplasm in moderately actinically damaged skin - right anterior proximal arm.  The atypical nature of this dysplastic nevus, the recommendation the patient undergo complete re-excision of this lesion as noted above, and management options were discussed extensively with the patient in the office today.  At this time, we again recommend referral to our Dermatologic surgery unit for complete re-excision of this lesion as discussed previously.  He expressed understanding, is in agreement with this plan, and states he already scheduled his surgery to be performed in our office by Dr. Motley on 6/9/25.  8. PERSONAL HISTORY OF MALIGNANT MELANOMA OF SKIN  Generalized  The sites of prior melanoma excision were examined.  There is no evidence of recurrent growth or pigmentation or recurrent disease, satellite papules, or nodules on exam today.  History of melanoma, atypical junctional melanocytic neoplasm, and actinic keratoses and photodamage.  There is no evidence of local, regional, or distant recurrent disease or any new primary melanomas on exam today.  We emphasized the importance of continuing to perform monthly self-skin and lymph node exams using the ABCDs of monitoring moles, which were reviewed with the patient, as  well as the importance of sun avoidance and sun protection with daily sunscreen use.  We will have the patient return to our office in 4 to 6 months, pending the above biopsy results, for routine melanoma follow-up and total body skin exam, and the patient was instructed to call our office should the patient notice any new, changing, symptomatic, or otherwise concerning skin lesions before then.  The patient expressed understanding and is in agreement with this plan.  9. DIFFUSE PHOTODAMAGE OF SKIN  Photodistributed  Diffuse photodamage with actinic changes with telangiectasia and mottled pigmentation in sun-exposed areas.  Photodamage.  The signs and symptoms of skin cancer were reviewed and the patient was advised to practice sun protection and sun avoidance, use daily sunscreen, and perform regular self skin exams.  Sun protection was discussed, including avoiding the mid-day sun, wearing a sunscreen with SPF at least 50, and stressing the need for reapplication of sunscreen and applying more than they think they need.  10. ENCOUNTER FOR SCREENING FOR MALIGNANT NEOPLASM OF SKIN  Generalized       Seen and discussed with attending physician Dr. Rosa Maria Dykes MD, PhD  Resident, Dermatology       I saw and evaluated the patient. I personally obtained the key and critical portions of the history and physical exam or was physically present for key and critical portions performed by the resident/fellow. I reviewed the resident/fellow's documentation and discussed the patient with the resident/fellow. I agree with the resident/fellow's medical decision making as documented in the note.    Elvin Crane MD         [1]   Current Outpatient Medications:     ascorbic acid (Vitamin C) 500 mg tablet, Take by mouth. (Patient not taking: Reported on 4/29/2025), Disp: , Rfl:     aspirin 325 mg EC tablet, Take 1 tablet (325 mg) by mouth once daily., Disp: , Rfl:     atorvastatin (Lipitor) 20 mg tablet, Take 1 tablet  (20 mg) by mouth once daily. (Patient not taking: Reported on 4/29/2025), Disp: 30 tablet, Rfl: 11    baclofen (Lioresal) 10 mg tablet, Take 1 tablet (10 mg) by mouth 3 times a day. (Patient not taking: Reported on 4/29/2025), Disp: , Rfl:     Blood glucose monitoring meter, 1 each 2 times a day. Please fill rx one touch verio (Patient not taking: Reported on 4/29/2025), Disp: 1 each, Rfl: 0    blood sugar diagnostic (Blood Glucose Test) strip, 1 each 2 times a day. (Patient not taking: Reported on 4/29/2025), Disp: 200 strip, Rfl: 3    blood sugar diagnostic (True Metrix Glucose Test Strip) strip, 1 each 2 times a day. (Patient not taking: Reported on 4/29/2025), Disp: 200 strip, Rfl: 3    cholecalciferol, vitamin D3, 125 mcg/0.5 mL (5K unit/0.5mL) drops, Take by mouth. (Patient not taking: Reported on 4/29/2025), Disp: , Rfl:     cinnamon bark (CINNAMON ORAL), Take 1,000 mg by mouth 3 times a day., Disp: , Rfl:     clindamycin (Cleocin T) 1 % lotion, Apply 1 Application topically 2 times a day. To chest and back, Disp: 60 mL, Rfl: 11    cyanocobalamin, vitamin B-12, (VITAMIN B-12 ORAL), Take by mouth., Disp: , Rfl:     dapagliflozin propanediol (Farxiga) 10 mg tablet, Take 1 tablet (10 mg) by mouth once daily., Disp: 90 tablet, Rfl: 1    flaxseed oiL 1,000 mg capsule, Take 1 Dose by mouth once daily. (Patient not taking: Reported on 4/29/2025), Disp: , Rfl:     glimepiride (AmaryL) 1 mg tablet, Take 1 tablet (1 mg) by mouth 2 times a day., Disp: 180 tablet, Rfl: 1    glucosamine-chondroitin 500-400 mg tablet, Take 1 tablet by mouth 3 times a day. (Patient not taking: Reported on 4/29/2025), Disp: , Rfl:     ketoconazole (NIZOral) 2 % shampoo, Wash affected areas of scalp 2-3 times weekly as directed (Patient not taking: Reported on 4/29/2025), Disp: 120 mL, Rfl: 11    ketoconazole-iodoquinoL-hc 2-1-2.5 % cream, Apply a thin layer to the affect area in the morning and at night (Patient not taking: Reported on  4/29/2025), Disp: 30 g, Rfl: 1    lancets (OneTouch Delica Plus Lancet) 33 gauge misc, 1 each 2 times a day. (Patient not taking: Reported on 4/29/2025), Disp: 200 each, Rfl: 3    lisinopril 20 mg tablet, Take 1 tablet (20 mg) by mouth once daily., Disp: 90 tablet, Rfl: 1    metFORMIN  mg 24 hr tablet, Take 1 tablet with breakfast and 2 tablets with dinner, Disp: 270 tablet, Rfl: 1    multivitamin (Daily Multi-Vitamin) tablet, Take by mouth., Disp: , Rfl:     naproxen sodium (Aleve) 220 mg capsule, Take 1 tablet by mouth every 12 hours if needed., Disp: , Rfl:     omega 3-dha-epa-fish oil (Fish OiL) 1,600-500-800 mg/5 mL liquid, Take 1 Dose by mouth once daily. (Patient not taking: Reported on 4/29/2025), Disp: , Rfl:     oxyCODONE (Roxicodone) 5 mg immediate release tablet, Take 1-2 tablets (5-10 mg) by mouth every 6 hours if needed. (Patient not taking: Reported on 4/29/2025), Disp: , Rfl:     pantoprazole (ProtoNix) 20 mg EC tablet, Take 1 tablet (20 mg) by mouth once daily. (Patient not taking: Reported on 4/29/2025), Disp: , Rfl:     pantoprazole (ProtoNix) 40 mg EC tablet, Take 1 tablet (40 mg) by mouth once daily. (Patient not taking: Reported on 4/29/2025), Disp: , Rfl:     saccharomyces boulardii (Florastor) 250 mg capsule, Take 1 capsule (250 mg) by mouth 2 times a day. (Patient not taking: Reported on 4/29/2025), Disp: , Rfl:

## 2025-05-28 NOTE — Clinical Note
Scattered on the patient's neck, chest, and back, there are several follicular-based erythematous, inflammatory papules and pustules

## 2025-05-28 NOTE — Clinical Note
History of melanoma, atypical junctional melanocytic neoplasm, and actinic keratoses and photodamage.  There is no evidence of local, regional, or distant recurrent disease or any new primary melanomas on exam today.  We emphasized the importance of continuing to perform monthly self-skin and lymph node exams using the ABCDs of monitoring moles, which were reviewed with the patient, as well as the importance of sun avoidance and sun protection with daily sunscreen use.  We will have the patient return to our office in 4 to 6 months, pending the above biopsy results, for routine melanoma follow-up and total body skin exam, and the patient was instructed to call our office should the patient notice any new, changing, symptomatic, or otherwise concerning skin lesions before then.  The patient expressed understanding and is in agreement with this plan.

## 2025-05-28 NOTE — Clinical Note
Clinically benign- to slightly atypical-appearing nevi - the clinically benign- to slightly atypical-appearing nature of the patient's nevi was discussed with the patient today.  None of the patient's nevi meet threshold for biopsy today.  We emphasized the importance of performing monthly self-skin exams using the ABCDs of monitoring moles, which were reviewed with the patient today, as well as the importance of sun avoidance and sun protection with daily sunscreen use.

## 2025-05-28 NOTE — Clinical Note
Actinic Keratoses - scattered on face.  The pre-cancerous nature of these lesions and treatment options were discussed with the patient today.  At this time, we recommend treatment with liquid nitrogen cryotherapy.  The patient expressed understanding, is in agreement with this plan, and wishes to proceed with cryotherapy today.

## 2025-05-30 LAB
LABORATORY COMMENT REPORT: NORMAL
PATH REPORT.FINAL DX SPEC: NORMAL
PATH REPORT.GROSS SPEC: NORMAL
PATH REPORT.MICROSCOPIC SPEC OTHER STN: NORMAL
PATH REPORT.RELEVANT HX SPEC: NORMAL
PATH REPORT.TOTAL CANCER: NORMAL

## 2025-06-09 ENCOUNTER — APPOINTMENT (OUTPATIENT)
Dept: DERMATOLOGY | Facility: CLINIC | Age: 70
End: 2025-06-09
Payer: MEDICARE

## 2025-06-09 ENCOUNTER — PATIENT MESSAGE (OUTPATIENT)
Dept: PRIMARY CARE | Facility: CLINIC | Age: 70
End: 2025-06-09

## 2025-06-09 DIAGNOSIS — D48.5 NEOPLASM OF UNCERTAIN BEHAVIOR OF SKIN: ICD-10-CM

## 2025-06-09 PROCEDURE — 99203 OFFICE O/P NEW LOW 30 MIN: CPT | Performed by: STUDENT IN AN ORGANIZED HEALTH CARE EDUCATION/TRAINING PROGRAM

## 2025-06-09 NOTE — PROGRESS NOTES
"Subjective     Cosme Noel is a 70 y.o. male who presents for the following: Excision (Right Anterior Proximal Arm-LCMN).     Review of Systems:  No other skin or systemic complaints other than what is documented elsewhere in the note.  Skin Cancer History  Biopsy Log Book  Biopsied Type Location Status   11/26/24 Other Benign Neoplasm Right Posterior Distal Leg No Treatment Required  12/13/24 11/26/24 Other Benign Neoplasm Right Anterior Distal Thigh No Treatment Required  12/13/24       Additional History      Specialty Problems          Dermatology Problems    Actinic keratosis    Follicular disorder, unspecified    Inflamed seborrheic keratosis    Melanocytic nevi of left lower limb, including hip    Melanocytic nevi of trunk    Other seborrheic dermatitis    Other specified follicular disorders    Personal history of malignant melanoma of skin    Sebaceous cyst    Seborrheic dermatitis, unspecified    Malignant melanoma of skin of abdomen (Multi)        Objective   Well appearing patient in no apparent distress; mood and affect are within normal limits.    A focused skin examination was performed. All findings within normal limits unless otherwise noted below.    Assessment/Plan   Skin Exam  1. NEOPLASM OF UNCERTAIN BEHAVIOR OF SKIN    Patient had shoulder surgery 4 weeks ago and is undergoing aggressive physical therapy. His scar from his NUB is directly in line with prior scar, as seen in the photo we took today. Provided that we would have to cut out part of his recent scar in order to excise the nub. Provided the recency of his shoulder surgery and that his nub is a \"LENTIGINOUS COMPOUND MELANOCYTIC NEOPLASM IN MODERATELY ACTINICALLY DAMAGED SKIN\", we opted to defer excision today and reschedule for a later time.   Related Procedures  Dermatopathology- DERM LAB    Mo Motley MD, PGY-5  , Department of Dermatology  Micrographic Surgery and Cutaneous Oncology " Fellow  6/9/2025

## 2025-09-08 ENCOUNTER — APPOINTMENT (OUTPATIENT)
Dept: GASTROENTEROLOGY | Facility: EXTERNAL LOCATION | Age: 70
End: 2025-09-08
Payer: MEDICARE

## 2025-09-22 ENCOUNTER — APPOINTMENT (OUTPATIENT)
Dept: DERMATOLOGY | Facility: CLINIC | Age: 70
End: 2025-09-22
Payer: MEDICARE

## 2025-11-04 ENCOUNTER — APPOINTMENT (OUTPATIENT)
Dept: ENDOCRINOLOGY | Facility: CLINIC | Age: 70
End: 2025-11-04
Payer: MEDICARE

## 2025-12-02 ENCOUNTER — APPOINTMENT (OUTPATIENT)
Dept: DERMATOLOGY | Facility: CLINIC | Age: 70
End: 2025-12-02
Payer: MEDICARE